# Patient Record
Sex: FEMALE | Race: WHITE | HISPANIC OR LATINO | Employment: FULL TIME | URBAN - METROPOLITAN AREA
[De-identification: names, ages, dates, MRNs, and addresses within clinical notes are randomized per-mention and may not be internally consistent; named-entity substitution may affect disease eponyms.]

---

## 2018-12-26 ENCOUNTER — HOSPITAL ENCOUNTER (EMERGENCY)
Facility: HOSPITAL | Age: 38
Discharge: HOME | End: 2018-12-26
Attending: EMERGENCY MEDICINE
Payer: COMMERCIAL

## 2018-12-26 ENCOUNTER — APPOINTMENT (EMERGENCY)
Dept: RADIOLOGY | Facility: HOSPITAL | Age: 38
End: 2018-12-26
Payer: COMMERCIAL

## 2018-12-26 VITALS
TEMPERATURE: 97.7 F | WEIGHT: 225 LBS | DIASTOLIC BLOOD PRESSURE: 82 MMHG | RESPIRATION RATE: 18 BRPM | HEART RATE: 92 BPM | SYSTOLIC BLOOD PRESSURE: 141 MMHG | OXYGEN SATURATION: 98 %

## 2018-12-26 DIAGNOSIS — S32.2XXA: Primary | ICD-10-CM

## 2018-12-26 LAB — EXT PREG TEST URINE: NEGATIVE

## 2018-12-26 PROCEDURE — 72220 X-RAY EXAM SACRUM TAILBONE: CPT

## 2018-12-26 PROCEDURE — 72170 X-RAY EXAM OF PELVIS: CPT

## 2018-12-26 PROCEDURE — 81025 URINE PREGNANCY TEST: CPT | Performed by: PHYSICIAN ASSISTANT

## 2018-12-26 PROCEDURE — 99284 EMERGENCY DEPT VISIT MOD MDM: CPT

## 2018-12-26 RX ORDER — OXYCODONE HYDROCHLORIDE AND ACETAMINOPHEN 5; 325 MG/1; MG/1
1 TABLET ORAL EVERY 8 HOURS PRN
Qty: 10 TABLET | Refills: 0 | Status: SHIPPED | OUTPATIENT
Start: 2018-12-26 | End: 2020-01-05

## 2018-12-26 RX ORDER — OXYCODONE HYDROCHLORIDE AND ACETAMINOPHEN 5; 325 MG/1; MG/1
1 TABLET ORAL ONCE
Status: COMPLETED | OUTPATIENT
Start: 2018-12-26 | End: 2018-12-26

## 2018-12-26 RX ADMIN — OXYCODONE HYDROCHLORIDE AND ACETAMINOPHEN 1 TABLET: 5; 325 TABLET ORAL at 21:44

## 2018-12-27 NOTE — DISCHARGE INSTRUCTIONS
Return to the Emergency Department sooner if increased pain, swelling, numbness, weakness, vomiting, difficulty breathing, redness  Use donut pillow  Coccyx Injury   WHAT YOU NEED TO KNOW:   What is a coccyx injury? A coccyx (tailbone) injury is when your coccyx breaks, dislocates, or is not stable  The coccyx is a small bone shaped like a triangle that forms the bottom of your spine  What causes a coccyx injury? · A car accident or a fall    · Medical conditions, such as hip arthritis or obesity    · Childbirth    · A direct hit to your coccyx, such as during sports    · Growths or an infection in the tissues near your coccyx  What are the signs and symptoms of a coccyx injury? Coccyx pain may last for a short period of time  It may also last for more than 2 months  You usually feel pain when you are about to sit, when you sit down, or when you stand up  You may also see or feel any of the following:  · Bruises or swelling on your coccyx or lower back    · Low backache or pressure in your pelvis (hip area)    · Pain in your buttocks that spreads to your thighs or legs    · Pain during bowel movements, when having sex, and when bending or lifting objects    · Trouble standing up or walking  How is a coccyx injury diagnosed? · A rectal exam  will be done to check for tenderness and the position of your coccyx  · An x-ray  may be done to look for a coccyx fracture  How is a coccyx injury treated? · Reduction  may be needed if you have a dislocated coccyx  During a reduction, your healthcare provider moves your tailbone into the correct position through your rectum  You will be given medicine to decrease discomfort during this procedure  · Medicines:     ¨ Prescription pain medicine  may be given to decrease pain  Do not wait until the pain is severe before you take this medicine  ¨ NSAIDs  decrease swelling and pain or fever  This medicine can be bought with or without a doctor's order   This medicine can cause stomach bleeding or kidney problems in certain people  If you take blood thinner medicine, always ask your healthcare provider if NSAIDs are safe for you  Always read the medicine label and follow the directions on it before using this medicine  ¨ A bowel movement softener  makes it easier and less painful for you to have a bowel movement  How can I manage my symptoms? · Use a donut-shaped cushion  to decrease pain and support your coccyx when you sit  · Ice  helps decrease swelling and pain  Ice may also help prevent tissue damage  Use an ice pack, or put crushed ice in a plastic bag  Cover it with a towel and place it on your coccyx for 15 to 20 minutes every hour or as directed  · Sleep  on a firm mattress  Place a pillow under your knees if you sleep on your back  Or, sleep on your side with a pillow between your knees  This will decrease pain and tension in your coccyx and back  When should I contact my healthcare provider? · You have trouble urinating or having a bowel movement  · Your pain or swelling get worse or do not go away with treatment  · You have a fever  · You have questions or concerns about your condition or care  When should I seek immediate help or call 911? · You have trouble breathing  · You cannot move your legs  · Your legs suddenly go numb  · You have severe pain  CARE AGREEMENT:   You have the right to help plan your care  Learn about your health condition and how it may be treated  Discuss treatment options with your caregivers to decide what care you want to receive  You always have the right to refuse treatment  The above information is an  only  It is not intended as medical advice for individual conditions or treatments  Talk to your doctor, nurse or pharmacist before following any medical regimen to see if it is safe and effective for you    © 2017 [employer]ura Bowman Information is for End User's use only and may not be sold, redistributed or otherwise used for commercial purposes  All illustrations and images included in CareNotes® are the copyrighted property of A D A M , Inc  or Ted Stephenson

## 2018-12-27 NOTE — ED PROVIDER NOTES
History  Chief Complaint   Patient presents with   Dereck Good 2 days ago, now sacral pain, denies head injury  42 y/o female slipped and fell onto her buttocks 2 days ago  Landed directly on buttocks  Progressively worsening pain since then  No pain above the buttocks, into the lumbar or thoracic spine  No other injuries reported  No head injury or LOC  Denies numbness tingling or weakness in her lower extremities  No saddle anesthesia  No urinary bowel incontinence  She has been ambulating without difficulty  She has pain with sitting  No pain with bowel movements  No fevers chills nausea vomiting  Does not take any anticoagulants or antiplatelets  No chronic steroid use  No malignancy  No history of IV drug abuse  History provided by:  Patient   used: No    Back Pain   Location:  Sacro-iliac joint  Quality:  Aching  Radiates to:  Does not radiate  Pain severity:  Severe  Pain is:  Same all the time  Onset quality:  Sudden  Duration:  2 days  Timing:  Constant  Progression:  Unchanged  Chronicity:  New  Context: falling    Relieved by:  Nothing  Worsened by:  Touching, sitting and twisting  Ineffective treatments:  OTC medications  Associated symptoms: no abdominal pain, no abdominal swelling, no bladder incontinence, no bowel incontinence, no chest pain, no dysuria, no fever, no headaches, no leg pain, no numbness, no paresthesias, no pelvic pain, no perianal numbness, no tingling, no weakness and no weight loss    Risk factors: no hx of cancer, no hx of osteoporosis, no lack of exercise, no menopause, not pregnant, no recent surgery, no steroid use and no vascular disease        None       History reviewed  No pertinent past medical history  Past Surgical History:   Procedure Laterality Date    CARPAL TUNNEL RELEASE       SECTION         History reviewed  No pertinent family history  I have reviewed and agree with the history as documented      Social History   Substance Use Topics    Smoking status: Never Smoker    Smokeless tobacco: Never Used    Alcohol use No        Review of Systems   Constitutional: Negative for activity change, appetite change, chills, diaphoresis, fatigue, fever, unexpected weight change and weight loss  HENT: Negative for congestion, rhinorrhea, sinus pressure, sore throat and trouble swallowing  Eyes: Negative for photophobia and visual disturbance  Respiratory: Negative for apnea, cough, choking, chest tightness, shortness of breath, wheezing and stridor  Cardiovascular: Negative for chest pain, palpitations and leg swelling  Gastrointestinal: Negative for abdominal distention, abdominal pain, blood in stool, bowel incontinence, constipation, diarrhea, nausea and vomiting  Genitourinary: Negative for bladder incontinence, decreased urine volume, difficulty urinating, dysuria, enuresis, flank pain, frequency, hematuria, pelvic pain and urgency  Musculoskeletal: Positive for back pain  Negative for arthralgias, myalgias, neck pain and neck stiffness  Skin: Negative for color change, pallor, rash and wound  Allergic/Immunologic: Negative  Neurological: Negative for dizziness, tingling, tremors, syncope, weakness, light-headedness, numbness, headaches and paresthesias  Hematological: Negative  Psychiatric/Behavioral: Negative  All other systems reviewed and are negative  Physical Exam  Physical Exam   Constitutional: She is oriented to person, place, and time  She appears well-developed and well-nourished  Non-toxic appearance  She does not have a sickly appearance  She does not appear ill  No distress  HENT:   Head: Normocephalic and atraumatic  Eyes: Pupils are equal, round, and reactive to light  EOM and lids are normal    Neck: Normal range of motion  Neck supple  Cardiovascular: Normal rate, regular rhythm, S1 normal, S2 normal, normal heart sounds, intact distal pulses and normal pulses  Exam reveals no gallop, no distant heart sounds, no friction rub and no decreased pulses  No murmur heard  Pulses:       Radial pulses are 2+ on the right side, and 2+ on the left side  Pulmonary/Chest: Effort normal and breath sounds normal  No accessory muscle usage  No apnea, no tachypnea and no bradypnea  No respiratory distress  She has no decreased breath sounds  She has no wheezes  She has no rhonchi  She has no rales  Abdominal: Soft  Normal appearance  She exhibits no distension  There is no tenderness  There is no rigidity, no rebound and no guarding  Musculoskeletal: Normal range of motion  She exhibits no edema or deformity  Lumbar back: She exhibits tenderness, bony tenderness and pain  She exhibits normal range of motion, no swelling, no edema, no deformity, no laceration, no spasm and normal pulse  Back:    Neurological: She is alert and oriented to person, place, and time  No cranial nerve deficit  GCS eye subscore is 4  GCS verbal subscore is 5  GCS motor subscore is 6  GCS 15  AAOx3  Ambulating in department without difficulty  CN II-XII grossly intact  No focal neuro deficits  Skin: Skin is warm, dry and intact  No rash noted  She is not diaphoretic  No erythema  No pallor  Psychiatric: Her speech is normal    Nursing note and vitals reviewed        Vital Signs  ED Triage Vitals [12/26/18 2100]   Temperature Pulse Respirations Blood Pressure SpO2   97 7 °F (36 5 °C) 91 18 159/83 99 %      Temp Source Heart Rate Source Patient Position - Orthostatic VS BP Location FiO2 (%)   Oral Monitor Sitting Left arm --      Pain Score       Worst Possible Pain           Vitals:    12/26/18 2100 12/26/18 2251   BP: 159/83 141/82   Pulse: 91 92   Patient Position - Orthostatic VS: Sitting        Visual Acuity      ED Medications  Medications   oxyCODONE-acetaminophen (PERCOCET) 5-325 mg per tablet 1 tablet (1 tablet Oral Given 12/26/18 4116)       Diagnostic Studies  Results Reviewed     Procedure Component Value Units Date/Time    POCT pregnancy, urine [[id number]]  (Normal) Resulted:  12/26/18 2153    Lab Status:  Final result Updated:  12/26/18 2154     EXT PREG TEST UR (Ref: Negative) negative                 XR sacrum and coccyx   ED Interpretation by Rainer Pedroza PA-C (12/26 2243)   fx coccyx      Final Result by Kaylah Wilson MD (12/27 5671)      Nondisplaced fracture of the coccyx  Findings are concordant with preliminary report from the emergency department as documented in [address] [place] [address] Rd  Workstation performed: [id number]         XR pelvis ap only 1 or 2 vw   ED Interpretation by Rainer Pedroza PA-C (12/26 2243)   fx coccyx      Final Result by Kaylah Wilson MD (12/27 3918)      Nondisplaced fracture of the coccyx  Findings are concordant with preliminary report from the emergency department as documented in [address] [place] [address] Rd  Workstation performed: [id number]                    Procedures  Procedures       Phone Contacts  ED Phone Contact    ED Course                               MDM  Number of Diagnoses or Management Options  Fractured coccyx [facility]): new and requires workup  Diagnosis management comments: Differential diagnosis including but not limited to: sprain, strain, fracture, dislocation, contusion  Plan: XR  Analgesia  dispo pending  Amount and/or Complexity of Data Reviewed  Tests in the radiology section of CPT®: ordered and reviewed  Independent visualization of images, tracings, or specimens: yes    Risk of Complications, Morbidity, and/or Mortality  Presenting problems: low  Management options: low  General comments: 40 y/o female with injury to sacrum after fall  Found to have non-displaced fx of coccyx  Her pain improved after being given analgesia  Will d/c home with pain control  Follow up PCP  Discussed basic care of coccyx fx  Return parameters provided  Pt understands and agrees with plan        Patient Progress  Patient progress: stable    CritCare Time    Disposition  Final diagnoses:   Fractured coccyx [facility])     Time reflects when diagnosis was documented in both MDM as applicable and the Disposition within this note     Time User Action Codes Description Comment    12/26/2018 10:44 PM Meghna Navarro Add Chepe Lugo  2XXA] Fractured [facility])       ED Disposition     ED Disposition Condition Comment    Discharge  Odean Holter discharge to home/self care  Condition at discharge: Good        Follow-up Information     Follow up With Specialties Details Why Contact Info Additional [address] [facility] Emergency Department Emergency Medicine Go to If symptoms worsen [address] [address] ED, [address] [place], [place], [address] [place] [address], MD Internal Medicine Call if you need a family doctor to follow up with, otherwise see your family doctor in follow up [address]  [phone]             Discharge Medication List as of 12/26/2018 10:45 PM      START taking these medications    Details   oxyCODONE-acetaminophen (PERCOCET) 5-325 mg per tablet Take 1 tablet by mouth every 8 (eight) hours as needed for moderate pain Max Daily Amount: 3 tablets, Starting Wed 12/26/2018, Print           No discharge procedures on file      ED Provider  Electronically Signed by           Rainer Pedroza PA-C  01/08/19 0551

## 2019-09-18 ENCOUNTER — HOSPITAL ENCOUNTER (EMERGENCY)
Facility: HOSPITAL | Age: 39
Discharge: HOME | End: 2019-09-19
Attending: EMERGENCY MEDICINE | Admitting: EMERGENCY MEDICINE

## 2019-09-18 ENCOUNTER — APPOINTMENT (EMERGENCY)
Dept: ULTRASOUND IMAGING | Facility: HOSPITAL | Age: 39
End: 2019-09-18

## 2019-09-18 VITALS
DIASTOLIC BLOOD PRESSURE: 74 MMHG | TEMPERATURE: 98 F | SYSTOLIC BLOOD PRESSURE: 115 MMHG | HEART RATE: 78 BPM | OXYGEN SATURATION: 100 % | RESPIRATION RATE: 18 BRPM

## 2019-09-18 DIAGNOSIS — R10.2 VAGINAL PAIN: Primary | ICD-10-CM

## 2019-09-18 DIAGNOSIS — R10.2 PELVIC PAIN: ICD-10-CM

## 2019-09-18 LAB
BILIRUB UR QL STRIP: NEGATIVE
CLARITY UR: CLEAR
COLOR UR: NORMAL
EXT PREG TEST URINE: NEGATIVE
EXT. CONTROL ED NAV: NORMAL
GLUCOSE UR STRIP-MCNC: NEGATIVE MG/DL
HGB UR QL STRIP.AUTO: NEGATIVE
KETONES UR STRIP-MCNC: NEGATIVE MG/DL
LEUKOCYTE ESTERASE UR QL STRIP: NEGATIVE
NITRITE UR QL STRIP: NEGATIVE
PH UR STRIP.AUTO: 6 [PH]
PROT UR STRIP-MCNC: NEGATIVE MG/DL
SP GR UR STRIP.AUTO: 1.01 (ref 1–1.03)
UROBILINOGEN UR QL STRIP.AUTO: 0.2 E.U./DL

## 2019-09-18 PROCEDURE — 81003 URINALYSIS AUTO W/O SCOPE: CPT | Performed by: EMERGENCY MEDICINE

## 2019-09-18 PROCEDURE — 76856 US EXAM PELVIC COMPLETE: CPT

## 2019-09-18 PROCEDURE — 87591 N.GONORRHOEAE DNA AMP PROB: CPT | Performed by: EMERGENCY MEDICINE

## 2019-09-18 PROCEDURE — 99284 EMERGENCY DEPT VISIT MOD MDM: CPT

## 2019-09-18 PROCEDURE — 76830 TRANSVAGINAL US NON-OB: CPT

## 2019-09-18 PROCEDURE — 87491 CHLMYD TRACH DNA AMP PROBE: CPT | Performed by: EMERGENCY MEDICINE

## 2019-09-18 PROCEDURE — 99284 EMERGENCY DEPT VISIT MOD MDM: CPT | Performed by: EMERGENCY MEDICINE

## 2019-09-18 PROCEDURE — 81025 URINE PREGNANCY TEST: CPT | Performed by: EMERGENCY MEDICINE

## 2019-09-18 RX ORDER — NAPROXEN 500 MG/1
500 TABLET ORAL 2 TIMES DAILY PRN
Qty: 14 TABLET | Refills: 0 | Status: SHIPPED | OUTPATIENT
Start: 2019-09-18 | End: 2020-01-05

## 2019-09-18 RX ORDER — METRONIDAZOLE 500 MG/1
500 TABLET ORAL ONCE
Status: COMPLETED | OUTPATIENT
Start: 2019-09-19 | End: 2019-09-18

## 2019-09-18 RX ORDER — NAPROXEN 250 MG/1
500 TABLET ORAL ONCE
Status: COMPLETED | OUTPATIENT
Start: 2019-09-19 | End: 2019-09-18

## 2019-09-18 RX ORDER — METRONIDAZOLE 500 MG/1
500 TABLET ORAL EVERY 12 HOURS SCHEDULED
Qty: 14 TABLET | Refills: 0 | Status: SHIPPED | OUTPATIENT
Start: 2019-09-18 | End: 2019-09-25

## 2019-09-18 RX ADMIN — NAPROXEN 500 MG: 250 TABLET ORAL at 23:58

## 2019-09-18 RX ADMIN — METRONIDAZOLE 500 MG: 500 TABLET, FILM COATED ORAL at 23:58

## 2019-09-19 NOTE — ED PROVIDER NOTES
History  Chief Complaint   Patient presents with    Vaginal Pain     pt states she started with vaginal pain and discharge last week  states she had her period for one day but she is normally a heavy bleeder  states she had a tubal ligation 6 years ago     HPI   56-year-old pleasant, well-appearing female presents to the emergency department with complaint of vaginal pain  Patient states that she has had pressure-like vaginal pain since last week  She reports associated clear odorless vaginal discharge and nausea  She also recalls polyuria and urinary frequency during this time  She denies having had similar symptoms in the past   She does mention that her menstrual period came as planned 2 weeks ago, but was light and only lasted for one day (while typically she bleeds more heavily and for multiple days)  Since that time, she has had almost constant suprapubic pressure, as if her uterus is full and heavy  She denies any associated complaints and has not noted any modifying factors for her symptoms  She denies any concern for STDs, is sexually active only with her   ROS otherwise negative  Prior to Admission Medications   Prescriptions Last Dose Informant Patient Reported? Taking?   oxyCODONE-acetaminophen (PERCOCET) 5-325 mg per tablet Not Taking at Unknown time  No No   Sig: Take 1 tablet by mouth every 8 (eight) hours as needed for moderate pain Max Daily Amount: 3 tablets   Patient not taking: Reported on 2019      Facility-Administered Medications: None       Past Medical History:   Diagnosis Date    Hemorrhoids        Past Surgical History:   Procedure Laterality Date    CARPAL TUNNEL RELEASE       SECTION      TUBAL LIGATION         History reviewed  No pertinent family history  I have reviewed and agree with the history as documented      Social History     Tobacco Use    Smoking status: Never Smoker    Smokeless tobacco: Never Used   Substance Use Topics    Alcohol use: No    Drug use: No        Review of Systems   Constitutional: Negative for fever  Respiratory: Negative for shortness of breath  Cardiovascular: Negative for chest pain  Gastrointestinal: Positive for nausea  Negative for vomiting  Endocrine: Positive for polyuria  Genitourinary: Positive for frequency, menstrual problem, pelvic pain and vaginal discharge  Negative for decreased urine volume and vaginal bleeding  Skin: Negative for wound  All other systems reviewed and are negative  Physical Exam  Physical Exam   Constitutional: She is oriented to person, place, and time  She appears well-nourished  No distress  HENT:   Head: Normocephalic and atraumatic  Eyes: EOM are normal    Neck: Normal range of motion  Neck supple  Cardiovascular: Normal rate and regular rhythm  Pulmonary/Chest: Effort normal and breath sounds normal  No respiratory distress  Abdominal: Soft  She exhibits no distension  There is no tenderness  Genitourinary: Pelvic exam was performed with patient supine  Genitourinary Comments: Pelvic exam difficult secondary to patient discomfort/anxiety  No obvious abnormality noted, however exam limited  Musculoskeletal: Normal range of motion  Neurological: She is alert and oriented to person, place, and time  Skin: Skin is warm and dry  She is not diaphoretic  Psychiatric: She has a normal mood and affect  Her behavior is normal    Nursing note and vitals reviewed        Vital Signs  ED Triage Vitals   Temperature Pulse Respirations Blood Pressure SpO2   09/18/19 2002 09/18/19 2002 09/18/19 2002 09/18/19 2002 09/18/19 2002   98 °F (36 7 °C) 86 18 129/86 100 %      Temp Source Heart Rate Source Patient Position - Orthostatic VS BP Location FiO2 (%)   09/18/19 2002 09/18/19 2002 09/18/19 2002 09/18/19 2002 --   Oral Monitor Sitting Left arm       Pain Score       09/18/19 2208       No Pain           Vitals:    09/18/19 2002 09/18/19 2208   BP: 129/86 115/74   Pulse: 86 78   Patient Position - Orthostatic VS: Sitting Lying         Visual Acuity      ED Medications  Medications   metroNIDAZOLE (FLAGYL) tablet 500 mg (500 mg Oral Given 9/18/19 2358)   naproxen (NAPROSYN) tablet 500 mg (500 mg Oral Given 9/18/19 2358)       Diagnostic Studies  Results Reviewed     Procedure Component Value Units Date/Time    Chlamydia/GC amplified DNA by PCR [[id number]]  (Normal) Collected:  09/18/19 2101    Lab Status:  Final result Specimen:  Urine, Other Updated:  09/20/19 0843     N gonorrhoeae, DNA Probe Negative     Chlamydia trachomatis, DNA Probe Negative    Narrative:       Test performed using PCR amplification of target DNA  This test is intended as an aid in the diagnosis of Chlamydial and gonococcal disease  This test has not been evaluated in patients younger than 15years of age and is not recommended for evaluation of suspected sexual abuse  Additional testing is recommended when the results do not correlate with clinical signs and symptoms  UA w Reflex to Microscopic w Reflex to Culture [[id number]] Collected:  09/18/19 2057    Lab Status:  Final result Specimen:  Urine, Clean Catch Updated:  09/18/19 2113     Color, UA Light Yellow     Clarity, UA Clear     Specific Gravity, UA 1 010     pH, UA 6 0     Leukocytes, UA Negative     Nitrite, UA Negative     Protein, UA Negative mg/dl      Glucose, UA Negative mg/dl      Ketones, UA Negative mg/dl      Urobilinogen, UA 0 2 E U /dl      Bilirubin, UA Negative     Blood, UA Negative    POCT pregnancy, urine [[id number]]  (Normal) Resulted:  09/18/19 2057    Lab Status:  Final result Updated:  09/18/19 2057     EXT PREG TEST UR (Ref: Negative) negative     Control valid                 US pelvis complete w transvaginal   Final Result by Timi Mancini MD (09/18 0730)       1  Markedly thickened endometrium  No evidence of uterine mass  2   Right ovarian 4 cm simple cyst   No evidence of torsion    Left ovary not visualized  Workstation performed: [id number]                    Procedures  Procedures       ED Course                               MDM    Disposition  Final diagnoses:   Vaginal pain   Pelvic pain     Time reflects when diagnosis was documented in both MDM as applicable and the Disposition within this note     Time User Action Codes Description Comment    9/18/2019 11:56 PM Eliu EmilyHeavenly Add [R10 2] Vaginal pain     9/18/2019 11:56 PM Zane Sutton Add [R10 2] Pelvic pain       ED Disposition     ED Disposition Condition Date/Time Comment    Discharge Stable Wed Sep 18, 2019 11:56 PM Uche Coon discharge to home/self care              Follow-up Information     Follow up With Specialties Details Why Contact Info Additional [address] Obstetrics and Gynecology Schedule an appointment as soon as possible for a visit   [address] [facility] [phone] [address] Gynecology [address] [facility], [facility] [place], [place], [address]    [facility] Emergency Department Emergency Medicine  As needed, If symptoms worsen 34 [facility] 1490 ED, [address] [place], [place], [address]          Discharge Medication List as of 9/18/2019 11:58 PM      START taking these medications    Details   metroNIDAZOLE (FLAGYL) 500 mg tablet Take 1 tablet (500 mg total) by mouth every 12 (twelve) hours for 7 days, Starting Wed 9/18/2019, Until Wed 9/25/2019, Print      naproxen (NAPROSYN) 500 mg tablet Take 1 tablet (500 mg total) by mouth 2 (two) times a day as needed for moderate pain for up to 7 days, Starting Wed 9/18/2019, Until Wed 9/25/2019, Print         CONTINUE these medications which have NOT CHANGED    Details   oxyCODONE-acetaminophen (PERCOCET) 5-325 mg per tablet Take 1 tablet by mouth every 8 (eight) hours as needed for moderate pain Max Daily Amount: 3 tablets, Starting Wed 12/26/2018, Print           No discharge procedures on file      ED Provider  Electronically Signed by           Janina Sy MD  09/30/19 2425

## 2019-09-20 LAB
C TRACH DNA SPEC QL NAA+PROBE: NEGATIVE
N GONORRHOEA DNA SPEC QL NAA+PROBE: NEGATIVE

## 2020-01-05 ENCOUNTER — APPOINTMENT (EMERGENCY)
Dept: ULTRASOUND IMAGING | Facility: HOSPITAL | Age: 40
End: 2020-01-05

## 2020-01-05 ENCOUNTER — HOSPITAL ENCOUNTER (EMERGENCY)
Facility: HOSPITAL | Age: 40
Discharge: HOME | End: 2020-01-05
Attending: EMERGENCY MEDICINE | Admitting: EMERGENCY MEDICINE

## 2020-01-05 VITALS
HEIGHT: 67 IN | DIASTOLIC BLOOD PRESSURE: 69 MMHG | BODY MASS INDEX: 35.31 KG/M2 | WEIGHT: 225 LBS | SYSTOLIC BLOOD PRESSURE: 116 MMHG | HEART RATE: 57 BPM | RESPIRATION RATE: 18 BRPM | OXYGEN SATURATION: 100 % | TEMPERATURE: 98 F

## 2020-01-05 DIAGNOSIS — N93.8 DYSFUNCTIONAL UTERINE BLEEDING: Primary | ICD-10-CM

## 2020-01-05 DIAGNOSIS — D64.9 ANEMIA: ICD-10-CM

## 2020-01-05 LAB
ANION GAP SERPL CALCULATED.3IONS-SCNC: 8 MMOL/L (ref 4–13)
APTT PPP: 26 SECONDS (ref 23–37)
BASOPHILS # BLD AUTO: 0.07 THOUSANDS/ΜL (ref 0–0.1)
BASOPHILS NFR BLD AUTO: 1 % (ref 0–1)
BUN SERPL-MCNC: 10 MG/DL (ref 5–25)
CALCIUM SERPL-MCNC: 8.8 MG/DL (ref 8.3–10.1)
CHLORIDE SERPL-SCNC: 105 MMOL/L (ref 100–108)
CO2 SERPL-SCNC: 28 MMOL/L (ref 21–32)
CREAT SERPL-MCNC: 0.61 MG/DL (ref 0.6–1.3)
EOSINOPHIL # BLD AUTO: 0.34 THOUSAND/ΜL (ref 0–0.61)
EOSINOPHIL NFR BLD AUTO: 4 % (ref 0–6)
ERYTHROCYTE [DISTWIDTH] IN BLOOD BY AUTOMATED COUNT: 18.2 % (ref 11.6–15.1)
EXT PREG TEST URINE: NEGATIVE
EXT. CONTROL ED NAV: NORMAL
GFR SERPL CREATININE-BSD FRML MDRD: 115 ML/MIN/1.73SQ M
GLUCOSE SERPL-MCNC: 94 MG/DL (ref 65–140)
HCT VFR BLD AUTO: 28.7 % (ref 34.8–46.1)
HGB BLD-MCNC: 7.8 G/DL (ref 11.5–15.4)
IMM GRANULOCYTES # BLD AUTO: 0.03 THOUSAND/UL (ref 0–0.2)
IMM GRANULOCYTES NFR BLD AUTO: 0 % (ref 0–2)
INR PPP: 0.97 (ref 0.84–1.19)
LYMPHOCYTES # BLD AUTO: 2.51 THOUSANDS/ΜL (ref 0.6–4.47)
LYMPHOCYTES NFR BLD AUTO: 29 % (ref 14–44)
MCH RBC QN AUTO: 20 PG (ref 26.8–34.3)
MCHC RBC AUTO-ENTMCNC: 27.2 G/DL (ref 31.4–37.4)
MCV RBC AUTO: 74 FL (ref 82–98)
MONOCYTES # BLD AUTO: 0.53 THOUSAND/ΜL (ref 0.17–1.22)
MONOCYTES NFR BLD AUTO: 6 % (ref 4–12)
NEUTROPHILS # BLD AUTO: 5.13 THOUSANDS/ΜL (ref 1.85–7.62)
NEUTS SEG NFR BLD AUTO: 60 % (ref 43–75)
NRBC BLD AUTO-RTO: 0 /100 WBCS
PLATELET # BLD AUTO: 513 THOUSANDS/UL (ref 149–390)
PMV BLD AUTO: 9 FL (ref 8.9–12.7)
POTASSIUM SERPL-SCNC: 3.5 MMOL/L (ref 3.5–5.3)
PROTHROMBIN TIME: 12.9 SECONDS (ref 11.6–14.5)
RBC # BLD AUTO: 3.9 MILLION/UL (ref 3.81–5.12)
SODIUM SERPL-SCNC: 141 MMOL/L (ref 136–145)
WBC # BLD AUTO: 8.61 THOUSAND/UL (ref 4.31–10.16)

## 2020-01-05 PROCEDURE — 36415 COLL VENOUS BLD VENIPUNCTURE: CPT | Performed by: PHYSICIAN ASSISTANT

## 2020-01-05 PROCEDURE — 76830 TRANSVAGINAL US NON-OB: CPT

## 2020-01-05 PROCEDURE — 80048 BASIC METABOLIC PNL TOTAL CA: CPT | Performed by: PHYSICIAN ASSISTANT

## 2020-01-05 PROCEDURE — 99284 EMERGENCY DEPT VISIT MOD MDM: CPT | Performed by: PHYSICIAN ASSISTANT

## 2020-01-05 PROCEDURE — 96372 THER/PROPH/DIAG INJ SC/IM: CPT

## 2020-01-05 PROCEDURE — 99284 EMERGENCY DEPT VISIT MOD MDM: CPT

## 2020-01-05 PROCEDURE — 81025 URINE PREGNANCY TEST: CPT | Performed by: PHYSICIAN ASSISTANT

## 2020-01-05 PROCEDURE — 76856 US EXAM PELVIC COMPLETE: CPT

## 2020-01-05 PROCEDURE — 85025 COMPLETE CBC W/AUTO DIFF WBC: CPT | Performed by: PHYSICIAN ASSISTANT

## 2020-01-05 PROCEDURE — 85730 THROMBOPLASTIN TIME PARTIAL: CPT | Performed by: PHYSICIAN ASSISTANT

## 2020-01-05 PROCEDURE — 85610 PROTHROMBIN TIME: CPT | Performed by: PHYSICIAN ASSISTANT

## 2020-01-05 PROCEDURE — 96374 THER/PROPH/DIAG INJ IV PUSH: CPT

## 2020-01-05 RX ORDER — FERROUS SULFATE 325(65) MG
325 TABLET ORAL 2 TIMES DAILY WITH MEALS
Qty: 30 TABLET | Refills: 0 | Status: SHIPPED | OUTPATIENT
Start: 2020-01-05 | End: 2020-01-14

## 2020-01-05 RX ORDER — IBUPROFEN 600 MG/1
TABLET ORAL EVERY 6 HOURS PRN
COMMUNITY

## 2020-01-05 RX ORDER — TRANEXAMIC ACID 650 1/1
2 TABLET ORAL 3 TIMES DAILY
Qty: 30 TABLET | Refills: 0 | Status: SHIPPED | OUTPATIENT
Start: 2020-01-05 | End: 2020-01-10

## 2020-01-05 RX ORDER — MEDROXYPROGESTERONE ACETATE 150 MG/ML
150 INJECTION, SUSPENSION INTRAMUSCULAR ONCE
Status: COMPLETED | OUTPATIENT
Start: 2020-01-05 | End: 2020-01-05

## 2020-01-05 RX ADMIN — TRANEXAMIC ACID 1000 MG: 1 INJECTION, SOLUTION INTRAVENOUS at 16:49

## 2020-01-05 RX ADMIN — MEDROXYPROGESTERONE ACETATE 150 MG: 150 INJECTION, SUSPENSION, EXTENDED RELEASE INTRAMUSCULAR at 16:24

## 2020-01-05 NOTE — ED PROVIDER NOTES
History  Chief Complaint   Patient presents with    Vaginal Bleeding     Pt co vaginal bleeding with clots x 1 month  Pt co pain and numbness in left left leg and side of abdomen  Melida Hankins is a 46 y o  female w PMH hemorrhoids who presents for evaluation of vaginal bleeding  Patient with vaginal bleeding ongoing since November  She estimates for about 2 months  She reports soaking through 10 large pads per day and passing heavy clots that are about a quarter to half dollar in size  She is also having pelvic pain, on the L side  She describes this as a burning pain w associated numbness  The pain radiates up the L side of the abdomen to under her L breast  Also radiates down into the groin and into the upper aspect of the L leg  Reports pain is predominant in groin and LLQ region  No real leg pain, more just numbness down lateral aspect of thigh, no weakness, no UE numbness  No headaches, blurred vision, l/d  No numbness or her foot  No trouble ambulating  Reports she does not have insurance so doesn't have anyone to follow up with and she has held off seeking evaluation thus far as she has been able to still take care of her family and go about her normal day wo dizziness  Some sx of fatigue and shortness of breath  Prior to Admission Medications   Prescriptions Last Dose Informant Patient Reported? Taking? Iron 15 MG/1 5ML SUSP   Yes Yes   Sig: Take by mouth   ibuprofen (MOTRIN) 600 mg tablet   Yes Yes   Sig: Take by mouth every 6 (six) hours as needed for mild pain      Facility-Administered Medications: None       Past Medical History:   Diagnosis Date    Hemorrhoids        Past Surgical History:   Procedure Laterality Date    CARPAL TUNNEL RELEASE       SECTION      TUBAL LIGATION         History reviewed  No pertinent family history  I have reviewed and agree with the history as documented      Social History     Tobacco Use    Smoking status: Never Smoker    Smokeless tobacco: Never Used   Substance Use Topics    Alcohol use: No    Drug use: No        Review of Systems   Constitutional: Negative for chills, diaphoresis and fever  HENT: Negative for congestion and sore throat  Eyes: Negative for visual disturbance  Respiratory: Negative for cough, chest tightness, shortness of breath and wheezing  Cardiovascular: Negative for chest pain and leg swelling  Gastrointestinal: Negative for abdominal pain, constipation, diarrhea, nausea and vomiting  Genitourinary: Positive for pelvic pain and vaginal bleeding  Negative for difficulty urinating, dysuria, frequency, hematuria, urgency, vaginal discharge and vaginal pain  Musculoskeletal: Negative for arthralgias and myalgias  Neurological: Positive for numbness  Negative for dizziness, weakness, light-headedness and headaches  Psychiatric/Behavioral: The patient is not nervous/anxious  Physical Exam  Physical Exam   Constitutional: She is oriented to person, place, and time  She appears well-developed and well-nourished  No distress  HENT:   Head: Normocephalic and atraumatic  Eyes: Pupils are equal, round, and reactive to light  Neck: Normal range of motion  Neck supple  No tracheal deviation present  Cardiovascular: Normal rate, regular rhythm, normal heart sounds and intact distal pulses  Exam reveals no gallop and no friction rub  No murmur heard  Pulmonary/Chest: Effort normal and breath sounds normal  No respiratory distress  She has no wheezes  She has no rales  Abdominal: Soft  Bowel sounds are normal  She exhibits no distension and no mass  There is tenderness  There is no guarding  LLQ abd pain      Musculoskeletal: She exhibits no edema or deformity  Neurological: She is alert and oriented to person, place, and time  Skin: Skin is warm and dry  She is not diaphoretic     Veins visible beneath skin in legs, but no calf pain or inguinal pain, no pain along the deep veins, no edema  No redness, no pain to palpation  Numbness to lateral thigh  Normal distal pulses  Psychiatric: She has a normal mood and affect  Her behavior is normal    Nursing note and vitals reviewed        Vital Signs  ED Triage Vitals [01/05/20 1324]   Temperature Pulse Respirations Blood Pressure SpO2   98 °F (36 7 °C) 88 20 128/77 99 %      Temp Source Heart Rate Source Patient Position - Orthostatic VS BP Location FiO2 (%)   Oral Monitor Sitting Left arm --      Pain Score       No Pain           Vitals:    01/05/20 1324 01/05/20 1613 01/05/20 1615   BP: 128/77 116/69 116/69   Pulse: 88 57    Patient Position - Orthostatic VS: Sitting Lying          Visual Acuity      ED Medications  Medications   tranexamic Acid 1,000 mg in sodium chloride 0 9 % 100 mL IVPB Loading Dose (0 mg Intravenous Stopped 1/5/20 1659)   medroxyPROGESTERone (DEPO-PROVERA) IM injection 150 mg (150 mg Intramuscular Given 1/5/20 1624)       Diagnostic Studies  Results Reviewed     Procedure Component Value Units Date/Time    POCT pregnancy, urine [[id number]]  (Normal) Resulted:  01/05/20 1615    Lab Status:  Final result Updated:  01/05/20 1615     EXT PREG TEST UR (Ref: Negative) negative     Control valid    Protime-INR [[id number]]  (Normal) Collected:  01/05/20 1411    Lab Status:  Final result Specimen:  Blood from Arm, Right Updated:  01/05/20 1442     Protime 12 9 seconds      INR 0 97    APTT [[id number]]  (Normal) Collected:  01/05/20 1411    Lab Status:  Final result Specimen:  Blood from Arm, Right Updated:  01/05/20 1442     PTT 26 seconds     Basic metabolic panel [[id number]] Collected:  01/05/20 1411    Lab Status:  Final result Specimen:  Blood from Arm, Right Updated:  01/05/20 1435     Sodium 141 mmol/L      Potassium 3 5 mmol/L      Chloride 105 mmol/L      CO2 28 mmol/L      ANION GAP 8 mmol/L      BUN 10 mg/dL      Creatinine 0 61 mg/dL      Glucose 94 mg/dL      Calcium 8 8 mg/dL      eGFR 115 ml/min/1 73sq m Narrative:       National Kid[employer] Foundation guidelines for Chronic Kidney Disease (CKD):     Stage 1 with normal or high GFR (GFR > 90 mL/min/1 73 square meters)    Stage 2 Mild CKD (GFR = 60-89 mL/min/1 73 square meters)    Stage 3A Moderate CKD (GFR = 45-59 mL/min/1 73 square meters)    Stage 3B Moderate CKD (GFR = 30-44 mL/min/1 73 square meters)    Stage 4 Severe CKD (GFR = 15-29 mL/min/1 73 square meters)    Stage 5 End Stage CKD (GFR <15 mL/min/1 73 square meters)  Note: GFR calculation is accurate only with a steady state creatinine    CBC and differential [[id number]]  (Abnormal) Collected:  01/05/20 1411    Lab Status:  Final result Specimen:  Blood from Arm, Right Updated:  01/05/20 1424     WBC 8 61 Thousand/uL      RBC 3 90 Million/uL      Hemoglobin 7 8 g/dL      Hematocrit 28 7 %      MCV 74 fL      MCH 20 0 pg      MCHC 27 2 g/dL      RDW 18 2 %      MPV 9 0 fL      Platelets 525 Thousands/uL      nRBC 0 /100 WBCs      Neutrophils Relative 60 %      Immat GRANS % 0 %      Lymphocytes Relative 29 %      Monocytes Relative 6 %      Eosinophils Relative 4 %      Basophils Relative 1 %      Neutrophils Absolute 5 13 Thousands/µL      Immature Grans Absolute 0 03 Thousand/uL      Lymphocytes Absolute 2 51 Thousands/µL      Monocytes Absolute 0 53 Thousand/µL      Eosinophils Absolute 0 34 Thousand/µL      Basophils Absolute 0 07 Thousands/µL                  US pelvis complete w transvaginal   Final Result by Federico Canales MD (01/05 1538)       Normal                       Workstation performed: [id number]                    Procedures  Procedures         ED Course                              MDM  Number of Diagnoses or Management Options  Anemia:   Dysfunctional uterine bleeding:   Diagnosis management comments: DDX includes but not ltd to:   Patient complains of heavy menstrual bleeding  Consider anemia  She is slightly symptomatic w some sx of fatigue and sob   Her weakness does not seem consistent w stroke as it is painful  Seems to originate in her stomach / pelvis where she is having pain, consider ovarian cyst, torsion less likely given chronicity of sx  Does not have sx of PID or acute infection, doubt UTI  Does not have LLE pain to suggest DVT other than numbness that radiates down the L thigh  No pain in the calf, thigh  24-20-52-61: Talked to on call OBGYN attending given her anemia and ongoing bleeding who advised TXA and depo here  Can send on home on TXA to be used as needed if recurrent heavy bleeding  Patient treated here w TXA and depo, discussed indications for TXA as an outpatient  Advised she follow up w the wom[facility] clinic in [place] as she does not have insurance  Return parameters discussed  Pt requires f/u as an outpt  Pt expresses understanding w above treatment plan  All questions answered prior to d/c  Portions of the record may have been created with voice recognition software   Occasional wrong word or "sound a like" substitutions may have occurred due to the inherent limitations of voice recognition software   Read the chart carefully and recognize, using context, where substitutions have occurred  Disposition  Final diagnoses:   Dysfunctional uterine bleeding   Anemia     Time reflects when diagnosis was documented in both MDM as applicable and the Disposition within this note     Time User Action Codes Description Comment    1/5/2020  3:52 PM Moni Lakhani Add [N93 8] Dysfunctional uterine bleeding     1/5/2020  3:52 PM Moni Lakhani Add [D64 9] Anemia       ED Disposition     ED Disposition Condition Date/Time Comment    Discharge Stable Sun Jan 5, 2020  3:52 PM Alisa Bryant discharge to home/self care              Follow-up Information     Follow up With Specialties Details Why Contact Info Additional [address] [facility] Emergency Department Emergency Medicine  If symptoms worsen [address] [facility] [address], [address] [place], [place], [address] [facility] Obstetrics and Gynecology Call in 1 day  [address]  [phone]  [address] [facility], [place], [place], [address]          Discharge Medication List as of 1/5/2020  4:08 PM      START taking these medications    Details   ferrous sulfate 325 (65 Fe) mg tablet Take 1 tablet (325 mg total) by mouth 2 (two) times a day with meals, Starting Sun 1/5/2020, Print      Tranexamic Acid 650 MG TABS Take 2 tablets by mouth 3 (three) times a day for 5 days You can take this medicine for up a day at a time for up to 5 days if you have severe heavy menstrual bleeding , Starting Sun 1/5/2020, Until Fri 1/10/2020, Print         CONTINUE these medications which have NOT CHANGED    Details   ibuprofen (MOTRIN) 600 mg tablet Take by mouth every 6 (six) hours as needed for mild pain, Historical Med      Iron 15 MG/1 5ML SUSP Take by mouth, Historical Med           No discharge procedures on file      ED Provider  Electronically Signed by           Dennie Prows, PA-C  01/10/20 8879

## 2020-01-14 ENCOUNTER — OFFICE VISIT (OUTPATIENT)
Dept: OBGYN CLINIC | Facility: CLINIC | Age: 40
End: 2020-01-14

## 2020-01-14 VITALS
BODY MASS INDEX: 39.33 KG/M2 | SYSTOLIC BLOOD PRESSURE: 114 MMHG | HEIGHT: 67 IN | DIASTOLIC BLOOD PRESSURE: 76 MMHG | WEIGHT: 250.6 LBS

## 2020-01-14 DIAGNOSIS — N93.9 ABNORMAL UTERINE BLEEDING (AUB): Primary | ICD-10-CM

## 2020-01-14 PROCEDURE — 99202 OFFICE O/P NEW SF 15 MIN: CPT | Performed by: NURSE PRACTITIONER

## 2020-01-14 RX ORDER — NORGESTIMATE AND ETHINYL ESTRADIOL 0.25-0.035
1 KIT ORAL DAILY
Qty: 30 TABLET | Refills: 0 | Status: SHIPPED | OUTPATIENT
Start: 2020-01-14 | End: 2020-10-26 | Stop reason: ALTCHOICE

## 2020-01-14 RX ORDER — MEDROXYPROGESTERONE ACETATE 150 MG/ML
150 INJECTION, SUSPENSION INTRAMUSCULAR
Qty: 1 ML | Refills: 0 | Status: SHIPPED | OUTPATIENT
Start: 2020-03-03 | End: 2020-10-26 | Stop reason: ALTCHOICE

## 2020-01-14 RX ORDER — MEDROXYPROGESTERONE ACETATE 150 MG/ML
150 INJECTION, SUSPENSION INTRAMUSCULAR
COMMUNITY
End: 2020-10-26 | Stop reason: ALTCHOICE

## 2020-01-14 NOTE — PATIENT INSTRUCTIONS
Tranexamic acid (By mouth)   Tranexamic Acid (kvei-tx-EB-ik AS-id)  Treats heavy monthly periods (menstrual cycles) in women  Brand Name(s): rEic   There may be other brand names for this medicine  When This Medicine Should Not Be Used: This medicine is not right for everyone  Do not use if you had an allergic reaction to tranexamic acid, or if you have blood clot problems or a history of blood clots  How to Use This Medicine:   Tablet  · Your doctor will tell you how much medicine to use  Do not use more than directed  · Start taking this medicine when your monthly period starts  Do not take this medicine for more than 5 days during your period  Do not take more than 6 tablets in one day  · Swallow the tablet whole with liquids  Do not break, crush, or chew it  · Read and follow the patient instructions that come with this medicine  Talk to your doctor or pharmacist if you have any questions  · Missed dose: If you miss a dose or forget to use your medicine, use it as soon as you can  Wait at least 6 hours before you take another dose  Do not use extra medicine to make up for a missed dose  · Store the medicine in a closed container at room temperature, away from heat, moisture, and direct light  Drugs and Foods to Avoid:   Ask your doctor or pharmacist before using any other medicine, including over-the-counter medicines, vitamins, and herbal products  · Do not use this medicine together with birth control that uses hormones, such as pills or a vaginal ring  · Some medicines and foods can affect how tranexamic acid works  Tell your doctor if you are using oral tretinoin or a medicine that changes how your blood clots, such as factor IX  Warnings While Using This Medicine:   · Tell your doctor if you are pregnant or breastfeeding, or if you have kidney disease, bleeding problems, or leukemia    · This medicine may cause the following problems:  ¨ Higher risk of blood clots (which can lead to heart attack or stroke) when used with hormone birth control, such as pills or a patch  ¨ Eye and vision problems  · If this medicine does not reduce your bleeding after 2 menstrual cycles or if it seems to stop working, check with your doctor  · Your doctor will check your progress and the effects of this medicine at regular visits  Keep all appointments  · Keep all medicine out of the reach of children  Never share your medicine with anyone  Possible Side Effects While Using This Medicine:   Call your doctor right away if you notice any of these side effects:  · Allergic reaction: Itching or hives, swelling in your face or hands, swelling or tingling in your mouth or throat, chest tightness, trouble breathing  · Chest pain, trouble breathing, or coughing up blood  · Numbness or weakness on one side of your body, sudden or severe headache, problems with vision, speech, or walking  · Pain in your lower leg  · Trouble seeing, vision changes  · Unusual bleeding, bruising, or weakness  If you notice these less serious side effects, talk with your doctor:   · Muscle, joint, or back pain  · Runny or stuffy nose  If you notice other side effects that you think are caused by this medicine, tell your doctor  Call your doctor for medical advice about side effects  You may report side effects to FDA at 6-429-FDA-7094  © 2017 [address] [employer] [employer] Information is for End User's use only and may not be sold, redistributed or otherwise used for commercial purposes  The above information is an  only  It is not intended as medical advice for individual conditions or treatments  Talk to your doctor, nurse or pharmacist before following any medical regimen to see if it is safe and effective for you  Medroxyprogesterone (By mouth)   Medroxyprogesterone (lu-jktq-fp-proe-PIOTR-ter-one)  Treats menstruation problems caused by a hormone imbalance   Prevents overgrowth of the uterine lining in women who are taking estrogen  Brand Name(s): Provera   There may be other brand names for this medicine  When This Medicine Should Not Be Used: This medicine is not right for everyone  Do not use it if you had an allergic reaction to medroxyprogesterone, if you are pregnant, or if you have liver disease, unusual vaginal bleeding that has not been checked by a doctor, or a history of breast cancer or blood clots  How to Use This Medicine:   Tablet  · Take your medicine as directed  Your dose may need to be changed several times to find what works best for you  · Read and follow the patient instructions that come with this medicine  Talk to your doctor or pharmacist if you have any questions  · Missed dose: Take a dose as soon as you remember  If it is almost time for your next dose, wait until then and take a regular dose  Do not take extra medicine to make up for a missed dose  · Store the medicine in a closed container at room temperature, away from heat, moisture, and direct light  Drugs and Foods to Avoid:      Ask your doctor or pharmacist before using any other medicine, including over-the-counter medicines, vitamins, and herbal products  Warnings While Using This Medicine:   · It is not safe to take this medicine during pregnancy  It could harm an unborn baby  Tell your doctor right away if you become pregnant  · Tell your doctor if you are breastfeeding or if you have kidney disease, heart disease, asthma, diabetes, endometriosis, high blood pressure, high cholesterol, lupus, porphyria, migraines, thyroid problems, or a history of seizures or cancer  Tell your doctor if you smoke  · This medicine may increase your risk for the following:   ¨ Blood clots, which could lead to stroke or heart attack  ¨ Dementia (when used with estrogen in women older than 65)  ¨ Breast or endometrial cancer (when used with estrogen)  · Tell any doctor who treats you that you use this medicine   You may need to stop taking it before you have surgery or if you need to be on bedrest   · Tell any doctor or dentist who treats you that you are using this medicine  This medicine may affect certain medical test results  · Your doctor will check your progress and the effects of this medicine at regular visits  Keep all appointments  · Keep all medicine out of the reach of children  Never share your medicine with anyone  Possible Side Effects While Using This Medicine:   Call your doctor right away if you notice any of these side effects:  · Allergic reaction: Itching or hives, swelling in your face or hands, swelling or tingling in your mouth or throat, chest tightness, trouble breathing  · Breast lump, pain, or tenderness  · Chest pain, trouble breathing, coughing up blood  · Loss of vision, blurred vision  · Numbness or weakness on one side of your body, sudden or severe headache, problems with speech or walking, pain in your lower leg (calf)  · Rapid weight gain, swelling in your hands, ankles, or feet  · Severe or unusual vaginal bleeding  · Sudden and severe stomach pain, nausea, vomiting, fever, lightheadedness  · Yellow skin or eyes  If you notice these less serious side effects, talk with your doctor:   · Depression, headache, dizziness, trouble sleeping  · Light vaginal bleeding or spotting  · Mild stomach pain or cramps  If you notice other side effects that you think are caused by this medicine, tell your doctor  Call your doctor for medical advice about side effects  You may report side effects to FDA at 2-736-FDA-8272  © 2017 [address] [employer] Information is for End User's use only and may not be sold, redistributed or otherwise used for commercial purposes  The above information is an  only  It is not intended as medical advice for individual conditions or treatments  Talk to your doctor, nurse or pharmacist before following any medical regimen to see if it is safe and effective for you    Ethinyl Estradiol/Norgestimate (By mouth)   Ethinyl Estradiol (ETH-i-nil es-tra-DYE-ol), Norgestimate (nds-CEG-ge-mate)  Prevents pregnancy and treats acne  This medicine is commonly called a birth control pill  Brand Name(s): Estarylla, Femynor, Mono-Linyah, MonoNessa, Ortho Tri-Cyclen, Ortho Tri-Cyclen Lo, Ortho-Cyclen, Previfem, Sprintec, Tri-Estarylla, Tri-Linyah, Tri-Lo-Estarylla, Tri-Lo-Trinidad, Tri-Lo-Sprintec, Tri-Previfem   There may be other brand names for this medicine  When This Medicine Should Not Be Used: This medicine is not right for everyone  Do not use it if you had an allergic reaction to ethinyl estradiol or norgestimate, or if you are pregnant or have unusual vaginal bleeding that has not been checked by your doctor  Do not use it if you have liver disease, breast cancer, or problems with blood clots  Do not use it if you have high blood pressure, certain heart problems, or diabetes with kidney, eye, nerve, or blood vessel damage  How to Use This Medicine:   Tablet  · Your doctor will tell you how much medicine to use  Do not use more than directed  · Each brand of birth control pills has specific directions  Read and follow the patient instructions for your prescribed brand  Talk to your doctor or pharmacist if you have any questions  · Ask your doctor if you should use a second form of birth control for the first 7 days of your first cycle of pills  · Take your pill at the same time every day  Birth control pills work best when there is no more than 24 hours between doses  Keep the pills in the original container  Take the pills in the order they appear in the container  · Follow the instructions in the patient leaflet or call your doctor if you vomit or have diarrhea within 3 to 4 hours of taking this medicine  · Missed dose: Read and carefully follow the patient instructions if you miss a dose  You may need to use a second form of birth control for several days   Ask your doctor or pharmacist if you have any questions  · Store the medicine in the original package at room temperature, away from heat, moisture, and direct light  Drugs and Foods to Avoid:   Ask your doctor or pharmacist before using any other medicine, including over-the-counter medicines, vitamins, and herbal products  · Some foods and medicines can affect how birth control pills work  Tell your doctor if you are also using the following:   ¨ Acetaminophen, aprepitant, ascorbic acid, aspirin, atorvastatin, boceprevir, bosentan, clofibrate, colesevelam, cyclosporine, morphine, prednisolone, rifabutin, rifampicin, rosuvastatin, Momo's wort, telaprevir, temazepam, theophylline, tizanidine  ¨ Seizure medicine, including carbamazepine, felbamate, lamotrigine, oxcarbazepine, phenytoin, topiramate  ¨ Thyroid replacement medicine  ¨ Medicine to treat an infection, including fluconazole, itraconazole, ketoconazole, voriconazole  ¨ Protease inhibitor to treat HIV/AIDS  Warnings While Using This Medicine:   · Tell your doctor right away if you think you have become pregnant  If you miss 2 monthly periods in a row, call your doctor for a pregnancy test before you take any more pills  · Tell your doctor if you are breastfeeding, or if you had a baby within 4 weeks before you start using this medicine  Tell your doctor if you have cervical cancer, diabetes, epilepsy, gallbladder problems, migraine headaches, heart or blood vessel disease, high cholesterol, or a family history of breast cancer or depression  Tell your doctor if you smoke or have a history of chloasma (skin discoloration of the face), especially during pregnancy    · This medicine may cause the following problems:  ¨ Blood clots, which may lead to stroke or heart attack (risk is increased by cigarette smoking)  ¨ Possible increased risk of breast or cervical cancer  ¨ Liver cancer or tumors  ¨ Gallbladder disease  ¨ High blood pressure  · You might have spotting or irregular bleeding when you first start to use this medicine  You might have unplanned bleeding if you miss a dose or are late taking it  Call your doctor if you think there is a problem, such as if you have heavy bleeding  · You may need to stop using this medicine for a few weeks before and after you have surgery because of the risk of blood clots  · This medicine will not protect you from HIV/AIDS or other sexually transmitted diseases  · Tell any doctor or dentist who treats you that you are using this medicine  This medicine may affect certain medical test results  · Keep all medicine out of the reach of children  Never share your medicine with anyone  Possible Side Effects While Using This Medicine:   Call your doctor right away if you notice any of these side effects:  · Allergic reaction: Itching or hives, swelling in your face or hands, swelling or tingling in your mouth or throat, chest tightness, trouble breathing  · Chest pain, trouble breathing, or coughing up blood  · Nausea, unusual sweating, fainting  · Numbness or weakness on one side of your body, sudden or severe headache, problems with vision, speech, or walking  · Pain in your lower leg (calf)  · Unusual or unexpected vaginal bleeding or heavy bleeding  · Yellow skin or eyes  · Vision loss, double vision  If you notice these less serious side effects, talk with your doctor:   · Depression, mood changes  · Headaches  · Light spotting or bleeding between periods  If you notice other side effects that you think are caused by this medicine, tell your doctor  Call your doctor for medical advice about side effects  You may report side effects to FDA at 6-619-FDA-2609  © 2017 [address] Information is for End User's use only and may not be sold, redistributed or otherwise used for commercial purposes  The above information is an  only  It is not intended as medical advice for individual conditions or treatments  Talk to your doctor, nurse or pharmacist before following any medical regimen to see if it is safe and effective for you  Dysfunctional Uterine Bleeding   WHAT YOU NEED TO KNOW:   What is dysfunctional uterine bleeding? Dysfunctional uterine bleeding (DUB) is abnormal uterine bleeding that is caused by a problem with your hormones  You may have bleeding from your uterus at times other than your normal monthly period  Your monthly periods may last longer or shorter, and bleeding may be heavier or lighter than usual    What causes DUB? DUB may be caused by too much or too little estrogen  You may have abnormal bleeding if an ovary does not release an egg during ovulation  Medical conditions such as polycystic ovary syndrome may increase your risk for DUB  What are the signs and symptoms of DUB? · Bleeding or spotting between periods    · Bleeding that starts 12 months or longer after you have been through menopause    · The amount of bleeding during your period is heavier or lighter than usual    · The number of days that you bleed during your regular period is longer than usual, or more than 7 days    · The number of days that you bleed is shorter than usual, or less than 2 days    · The time between your monthly periods is shorter or longer than usual  How is DUB diagnosed? · Blood tests  may be done to find the cause of your DUB and problems caused by DUB, such as anemia  · A pelvic exam  may be done to find the source of your bleeding  · A hysteroscopy  is a procedure to look at your endometrium  The endometrium is the lining inside of your uterus  Your healthcare provider will insert a small tube with a camera at the end into your uterus  · A biopsy  is a procedure to remove a small piece of tissue from the endometrium  The tissue is sent to a lab for tests  · An ultrasound  uses sound waves to show pictures of your uterus, ovaries, tubes, and vagina on a monitor       · A pap smear  may be needed  Your healthcare provider takes a sample of tissue from your cervix and sends it to a lab for tests  How is DUB treated? · Medicines:      ¨ Hormones  help decrease bleeding by making your monthly periods more regular  Sometimes this medicine may be given as birth control pills  ¨ NSAIDs  help decrease swelling and pain or fever  This medicine is available with or without a doctor's order  NSAIDs can cause stomach bleeding or kidney problems in certain people  If you take blood thinner medicine, always ask your healthcare provider if NSAIDs are safe for you  Always read the medicine label and follow directions  ¨ Iron supplements  may be given if your blood iron level decreases because of heavy bleeding  Iron may make you constipated  Ask your healthcare provider for ways to prevent or treat constipation  Iron may also make your bowel movements turn dark or black  · Surgery and procedures  may be needed if medicines do not work or cannot be used  You may need procedures, such as endometrial ablation or dilation and curettage, to control your bleeding  You may need an abdominal or vaginal hysterectomy  A hysterectomy is surgery to remove your uterus  How do I care for myself at home? · Apply heat  on your lower abdomen for 20 to 30 minutes every 2 hours for as many days as directed  Heat helps decrease pain and muscle spasms  · Include foods high in iron  if needed  Examples of foods high in iron are leafy green vegetables, beef, pork, liver, eggs, and whole-grain breads and cereals  · Keep a diary of your menstrual cycles  Keep track of the number of tampons or pads you use each day  · Talk to your healthcare provider before you start a weight loss program   You may need to wait until the abnormal bleeding has stopped before you try to lose weight  The amount of iron in your blood should be normal before you lose weight  When should I contact my healthcare provider?    · You need to change your sanitary pad or tampon more than once an hour  · Your medicine causes nausea, vomiting, or diarrhea  · You have questions or concerns about your condition or care  When should I seek immediate care or call 911? · You continue to bleed heavily, or you feel faint  CARE AGREEMENT:   You have the right to help plan your care  Learn about your health condition and how it may be treated  Discuss treatment options with your caregivers to decide what care you want to receive  You always have the right to refuse treatment  The above information is an  only  It is not intended as medical advice for individual conditions or treatments  Talk to your doctor, nurse or pharmacist before following any medical regimen to see if it is safe and effective for you  © 2017 [address] [facility] Information is for End User's use only and may not be sold, redistributed or otherwise used for commercial purposes  All illustrations and images included in CareNotes® are the copyrighted property of [employer]  or [employer]  Birth Control Pills   WHAT YOU NEED TO KNOW:   What are birth control pills? Birth control pills are also called oral contraceptives, or the pill  It is medicine that helps prevent pregnancy  Birth control pills work by preventing ovulation  Ovulation is when the ovaries make and release an egg cell each month  If this egg gets fertilized by sperm, pregnancy occurs  Birth control pills may also help to prevent pregnancy by keeping sperm from fertilizing an egg  What may be done before I can start taking birth control pills? You need to see your healthcare provider to get a prescription  Any of the following may be done before your healthcare provider gives you a prescription:  · Your healthcare provider will ask you about diseases and illnesses you have had in the past  He will check your risk for blood clots, heart conditions, or stroke   He will also check your blood pressure, and may do a breast and pelvic exam  A Pap smear may also be done during the pelvic exam  This is a test to make sure you do not have abnormal changes on your cervix  You may need other tests, such as a urine test, to make sure you are not pregnant  · Your healthcare provider will ask if you take any medicines and if you smoke  Smoking increases your risk for stroke, heart attack, or a blood clot in your lungs  If you smoke, you should not take certain kinds of birth control pills  What are the advantages of birth control pills? When birth control pills are used correctly, the chances of getting pregnant are very low  Birth control pills may help decrease bleeding and pain during your monthly period  They may also help prevent cancer of the uterus and ovaries  What are the disadvantages of birth control pills? You may have sudden changes in your mood or feelings while you take birth control pills  You may have nausea and decreased sex drive  You may have an increased appetite and rapid weight gain  You may also have bleeding in between periods, less frequent periods, vaginal dryness, and breast pain  Birth control pills will not protect you from sexually transmitted infections  Rarely, some birth control pills can increase your risk for a blood clot  This may become life-threatening  What should I do if I decide I want to get pregnant? If you are planning to have a baby, ask your healthcare provider when you may stop taking your birth control pills  It may take some time for you to start ovulating again  Ask your healthcare provider for more information about pregnancy after birth control pills  When should I start taking birth control pills after I have a baby? If you are not breastfeeding, you may start taking birth control pills 3 weeks after you give birth  You may be able to take certain types of birth control pills if you are breastfeeding   These pills can be started from 6 weeks to 6 months after you give birth  Ask your healthcare provider for more information about when to start taking birth control pills after you give birth  When should I contact my healthcare provider? · You have forgotten to take a birth control pill  · You have mood changes, such as depression, since starting birth control pills  · You have nausea or you are vomiting  · You have severe abdominal pain  · You missed a period and have questions or concerns about being pregnant  · You still have bleeding 4 months after taking birth control pills correctly  · You have questions or concerns about your condition or care  When should I seek immediate care or call 911? · Your arm or leg feels warm, tender, and painful  It may look swollen and red  · You feel lightheaded, short of breath, and have chest pain  · You cough up blood  · You have any of the following signs of a stroke:      ¨ Numbness or drooping on one side of your face     ¨ Weakness in an arm or leg    ¨ Confusion or difficulty speaking    ¨ Dizziness, a severe headache, or vision loss    · You have severe pain, numbness, or swelling in your arms or legs  CARE AGREEMENT:   You have the right to help plan your care  Learn about your health condition and how it may be treated  Discuss treatment options with your caregivers to decide what care you want to receive  You always have the right to refuse treatment  The above information is an  only  It is not intended as medical advice for individual conditions or treatments  Talk to your doctor, nurse or pharmacist before following any medical regimen to see if it is safe and effective for you  © 2017 [address] Information is for End User's use only and may not be sold, redistributed or otherwise used for commercial purposes   All illustrations and images included in CareNotes® are the copyrighted property of [employer]  or [employer]  Ethinyl Estradiol/Norgestimate (By mouth)   Ethinyl Estradiol (ETH-i-nil es-tra-DYE-ol), Norgestimate (sji-LWU-qf-mate)  Prevents pregnancy and treats acne  This medicine is commonly called a birth control pill  Brand Name(s): Estarylla, Femynor, Mono-Linyah, MonoNessa, Ortho Tri-Cyclen, Ortho Tri-Cyclen Lo, Ortho-Cyclen, Previfem, Sprintec, Tri-Estarylla, Tri-Linyah, Tri-Lo-Estarylla, Tri-Lo-Trinidad, Tri-Lo-Sprintec, Tri-Previfem   There may be other brand names for this medicine  When This Medicine Should Not Be Used: This medicine is not right for everyone  Do not use it if you had an allergic reaction to ethinyl estradiol or norgestimate, or if you are pregnant or have unusual vaginal bleeding that has not been checked by your doctor  Do not use it if you have liver disease, breast cancer, or problems with blood clots  Do not use it if you have high blood pressure, certain heart problems, or diabetes with kidney, eye, nerve, or blood vessel damage  How to Use This Medicine:   Tablet  · Your doctor will tell you how much medicine to use  Do not use more than directed  · Each brand of birth control pills has specific directions  Read and follow the patient instructions for your prescribed brand  Talk to your doctor or pharmacist if you have any questions  · Ask your doctor if you should use a second form of birth control for the first 7 days of your first cycle of pills  · Take your pill at the same time every day  Birth control pills work best when there is no more than 24 hours between doses  Keep the pills in the original container  Take the pills in the order they appear in the container  · Follow the instructions in the patient leaflet or call your doctor if you vomit or have diarrhea within 3 to 4 hours of taking this medicine  · Missed dose: Read and carefully follow the patient instructions if you miss a dose  You may need to use a second form of birth control for several days   Ask your doctor or pharmacist if you have any questions  · Store the medicine in the original package at room temperature, away from heat, moisture, and direct light  Drugs and Foods to Avoid:   Ask your doctor or pharmacist before using any other medicine, including over-the-counter medicines, vitamins, and herbal products  · Some foods and medicines can affect how birth control pills work  Tell your doctor if you are also using the following:   ¨ Acetaminophen, aprepitant, ascorbic acid, aspirin, atorvastatin, boceprevir, bosentan, clofibrate, colesevelam, cyclosporine, morphine, prednisolone, rifabutin, rifampicin, rosuvastatin, Momo's wort, telaprevir, temazepam, theophylline, tizanidine  ¨ Seizure medicine, including carbamazepine, felbamate, lamotrigine, oxcarbazepine, phenytoin, topiramate  ¨ Thyroid replacement medicine  ¨ Medicine to treat an infection, including fluconazole, itraconazole, ketoconazole, voriconazole  ¨ Protease inhibitor to treat HIV/AIDS  Warnings While Using This Medicine:   · Tell your doctor right away if you think you have become pregnant  If you miss 2 monthly periods in a row, call your doctor for a pregnancy test before you take any more pills  · Tell your doctor if you are breastfeeding, or if you had a baby within 4 weeks before you start using this medicine  Tell your doctor if you have cervical cancer, diabetes, epilepsy, gallbladder problems, migraine headaches, heart or blood vessel disease, high cholesterol, or a family history of breast cancer or depression  Tell your doctor if you smoke or have a history of chloasma (skin discoloration of the face), especially during pregnancy    · This medicine may cause the following problems:  ¨ Blood clots, which may lead to stroke or heart attack (risk is increased by cigarette smoking)  ¨ Possible increased risk of breast or cervical cancer  ¨ Liver cancer or tumors  ¨ Gallbladder disease  ¨ High blood pressure  · You might have spotting or irregular bleeding when you first start to use this medicine  You might have unplanned bleeding if you miss a dose or are late taking it  Call your doctor if you think there is a problem, such as if you have heavy bleeding  · You may need to stop using this medicine for a few weeks before and after you have surgery because of the risk of blood clots  · This medicine will not protect you from HIV/AIDS or other sexually transmitted diseases  · Tell any doctor or dentist who treats you that you are using this medicine  This medicine may affect certain medical test results  · Keep all medicine out of the reach of children  Never share your medicine with anyone  Possible Side Effects While Using This Medicine:   Call your doctor right away if you notice any of these side effects:  · Allergic reaction: Itching or hives, swelling in your face or hands, swelling or tingling in your mouth or throat, chest tightness, trouble breathing  · Chest pain, trouble breathing, or coughing up blood  · Nausea, unusual sweating, fainting  · Numbness or weakness on one side of your body, sudden or severe headache, problems with vision, speech, or walking  · Pain in your lower leg (calf)  · Unusual or unexpected vaginal bleeding or heavy bleeding  · Yellow skin or eyes  · Vision loss, double vision  If you notice these less serious side effects, talk with your doctor:   · Depression, mood changes  · Headaches  · Light spotting or bleeding between periods  If you notice other side effects that you think are caused by this medicine, tell your doctor  Call your doctor for medical advice about side effects  You may report side effects to FDA at 5-308-FDA-5933  © 2017 [address] [employer] Information is for End User's use only and may not be sold, redistributed or otherwise used for commercial purposes  The above information is an  only   It is not intended as medical advice for individual conditions or treatments  Talk to your doctor, nurse or pharmacist before following any medical regimen to see if it is safe and effective for you

## 2020-01-14 NOTE — PROGRESS NOTES
Assessment/Plan:      Diagnoses and all orders for this visit:    Abnormal uterine bleeding (AUB)  -     TSH, 3rd generation with Free T4 reflex; Future  -     norgestimate-ethinyl estradiol (ORTHO-CYCLEN) 0 25-35 MG-MCG per tablet; Take 1 tablet by mouth daily  -     medroxyPROGESTERone (DEPO-PROVERA) 150 mg/mL injection; Inject 1 mL (150 mg total) into a muscle every 3 (three) months    Other orders  -     medroxyPROGESTERone (DEPO-PROVERA) 150 mg/mL injection; Inject 150 mg into a muscle every 3 (three) months      -reviewed with patient no structural abnormality seen on ultrasound  -reviewed treatment of abnormal uterine bleeding with patient including NSAIDs, hormones and surgical options   -currently Depo-Provera effective through 3/29/20  -will order trial of Sprintec for 1 month to see if bleeding stops  -encouraged patient to get TSH with reflex drawn  -patient will return to office 3/2020 if satisfied with Depo-Provera will provide Depo-Provera 2  Injection at that time  If not satisfied will discuss options for staying on oral contraceptive  Patient is not interested in surgical intervention at this time  Encouraged patient to call office if bleeding returns after OCP is stopped  Patient verbalizes understanding   -common side effects of nausea, breast tenderness and irregular vaginal bleeding reviewed  ACHES reviewed  Patient will start [facility] today-quick start  Written information provided  -encouraged patient to avoid taking Lysteda while taking birth control pill  Patient verbalizes understanding  -reviewed possibility of endometrial biopsy-endometrium measures 14 mm, risk factors include  ethnicity and obesity patient verbalizes understanding  Declines procedure at this time given no insurance  RTO 3/2020 for follow-up    Subjective:     Patient ID: Alena Vo is a 46 y o  female  HPI  here for follow-up emergency department visit    Was seen in emergency room 1/5/2020 with complaints of heavy prolonged menses  Pelvic ultrasound reviewed-WNL  Labs reviewed significant for anemia H/H 7 8/28 7  Was given Depo-Provera prior to discharge and transanemic acid for home  Patient states menses have been abnormal since 9/2019  Menses would last 3-4 weeks would have 4-5 days bleed free then menses would return for 3-4 weeks  Patient states bleeding is very heavy necessitating pad changes every 2-3 hours  States heavy days are approximately 17-20 per bleed cycle of 3-4 weeks  Has tubal ligation for contraception  Denies new medications, vitamins or supplements  Admits to easy fatigue   Patient states she had 2 days of no bleeding after released from hospital with Depo-Provera stopped taking trans anemic acid and started bleeding again  Last Pap smear about 2 years ago-normal per patient    Review of Systems   Constitutional: Positive for fatigue  Negative for chills and fever  Respiratory: Negative  Cardiovascular: Negative  Gastrointestinal: Negative  Genitourinary: Positive for menstrual problem  Negative for decreased urine volume, difficulty urinating, dysuria, enuresis, flank pain, frequency, genital sores, hematuria, pelvic pain, urgency, vaginal bleeding, vaginal discharge and vaginal pain  Objective:     Physical Exam   Constitutional: She is oriented to person, place, and time  She appears well-developed and well-nourished  Cardiovascular: Normal rate, regular rhythm and normal heart sounds  Pulmonary/Chest: Effort normal and breath sounds normal    Genitourinary:   Genitourinary Comments: Patient declined given recent exam in emergency department   Neurological: She is alert and oriented to person, place, and time  Psychiatric: She has a normal mood and affect   Her behavior is normal  Thought content normal

## 2020-01-19 ENCOUNTER — HOSPITAL ENCOUNTER (EMERGENCY)
Facility: HOSPITAL | Age: 40
Discharge: HOME | End: 2020-01-19
Attending: EMERGENCY MEDICINE | Admitting: EMERGENCY MEDICINE

## 2020-01-19 VITALS
DIASTOLIC BLOOD PRESSURE: 77 MMHG | SYSTOLIC BLOOD PRESSURE: 117 MMHG | HEART RATE: 113 BPM | OXYGEN SATURATION: 100 % | RESPIRATION RATE: 22 BRPM | TEMPERATURE: 98.7 F

## 2020-01-19 DIAGNOSIS — J11.1 INFLUENZA-LIKE ILLNESS: Primary | ICD-10-CM

## 2020-01-19 PROCEDURE — 99282 EMERGENCY DEPT VISIT SF MDM: CPT | Performed by: PHYSICIAN ASSISTANT

## 2020-01-19 PROCEDURE — 99283 EMERGENCY DEPT VISIT LOW MDM: CPT

## 2020-01-20 NOTE — ED PROVIDER NOTES
History  Chief Complaint   Patient presents with    Cough     Pt presents to ED with cough and fever since friday      64-year-old female with cough congestion body aches rhinorrhea earache for the past 3 days  Multiple bladder workplace or diagnosed with flu  She complains primarily of her generalized myalgias  She has been eating less but drinking per usual   Taking Tylenol Motrin around the clock  No vomiting  Normal bowel movements normal urination  No trouble breathing  History provided by:  Patient   used: No    Flu Symptoms   Presenting symptoms: cough, fatigue, fever, headache, myalgias, rhinorrhea and sore throat    Presenting symptoms: no diarrhea, no nausea, no shortness of breath and no vomiting    Severity:  Moderate  Onset quality:  Gradual  Progression:  Unchanged  Chronicity:  New  Relieved by:  Nothing  Worsened by:  Nothing  Ineffective treatments:  None tried  Associated symptoms: chills, decreased appetite, ear pain and nasal congestion    Associated symptoms: no decrease in physical activity, no mental status change, no neck stiffness and no syncope    Risk factors: sick contacts        Prior to Admission Medications   Prescriptions Last Dose Informant Patient Reported? Taking?    Iron 15 MG/1 5ML SUSP   Yes No   Sig: Take by mouth   ibuprofen (MOTRIN) 600 mg tablet   Yes No   Sig: Take by mouth every 6 (six) hours as needed for mild pain   medroxyPROGESTERone (DEPO-PROVERA) 150 mg/mL injection   Yes No   Sig: Inject 150 mg into a muscle every 3 (three) months   medroxyPROGESTERone (DEPO-PROVERA) 150 mg/mL injection   No No   Sig: Inject 1 mL (150 mg total) into a muscle every 3 (three) months   norgestimate-ethinyl estradiol (ORTHO-CYCLEN) 0 25-35 MG-MCG per tablet   No No   Sig: Take 1 tablet by mouth daily      Facility-Administered Medications: None       Past Medical History:   Diagnosis Date    CTS (carpal tunnel syndrome)     Hemorrhoids        Past Surgical History:   Procedure Laterality Date    CARPAL TUNNEL RELEASE       SECTION      TUBAL LIGATION         History reviewed  No pertinent family history  I have reviewed and agree with the history as documented  Social History     Tobacco Use    Smoking status: Never Smoker    Smokeless tobacco: Never Used   Substance Use Topics    Alcohol use: No    Drug use: No        Review of Systems   Constitutional: Positive for chills, decreased appetite, fatigue and fever  Negative for activity change, appetite change, diaphoresis and unexpected weight change  HENT: Positive for congestion, ear pain, rhinorrhea and sore throat  Negative for sinus pressure and trouble swallowing  Eyes: Negative for photophobia and visual disturbance  Respiratory: Positive for cough  Negative for apnea, choking, chest tightness, shortness of breath, wheezing and stridor  Cardiovascular: Negative for chest pain, palpitations and leg swelling  Gastrointestinal: Negative for abdominal distention, abdominal pain, blood in stool, constipation, diarrhea, nausea and vomiting  Genitourinary: Negative for decreased urine volume, difficulty urinating, dysuria, enuresis, flank pain, frequency, hematuria and urgency  Musculoskeletal: Positive for myalgias  Negative for arthralgias, neck pain and neck stiffness  Skin: Negative for color change, pallor, rash and wound  Allergic/Immunologic: Negative  Neurological: Positive for headaches  Negative for dizziness, tremors, syncope, weakness, light-headedness and numbness  Hematological: Negative  Psychiatric/Behavioral: Negative  All other systems reviewed and are negative  Physical Exam  Physical Exam   Constitutional: She is oriented to person, place, and time  She appears well-developed and well-nourished  Non-toxic appearance  She does not have a sickly appearance  She does not appear ill  No distress     HENT:   Head: Normocephalic and atraumatic  Right Ear: Hearing, tympanic membrane, external ear and ear canal normal    Left Ear: Hearing, tympanic membrane, external ear and ear canal normal    Nose: Rhinorrhea present  Mouth/Throat: Uvula is midline, oropharynx is clear and moist and mucous membranes are normal    Eyes: Pupils are equal, round, and reactive to light  EOM and lids are normal    Neck: Normal range of motion  Neck supple  Cardiovascular: Normal rate, regular rhythm, S1 normal, S2 normal, normal heart sounds, intact distal pulses and normal pulses  Exam reveals no gallop, no distant heart sounds, no friction rub and no decreased pulses  No murmur heard  Pulses:       Radial pulses are 2+ on the right side, and 2+ on the left side  Pulmonary/Chest: Effort normal and breath sounds normal  No accessory muscle usage  No apnea, no tachypnea and no bradypnea  No respiratory distress  She has no decreased breath sounds  She has no wheezes  She has no rhonchi  She has no rales  Abdominal: Soft  Normal appearance  She exhibits no distension  There is no tenderness  There is no rigidity, no rebound and no guarding  Musculoskeletal: Normal range of motion  She exhibits no edema, tenderness or deformity  Neurological: She is alert and oriented to person, place, and time  No cranial nerve deficit  GCS eye subscore is 4  GCS verbal subscore is 5  GCS motor subscore is 6  Skin: Skin is warm, dry and intact  No rash noted  She is not diaphoretic  No erythema  No pallor  Psychiatric: Her speech is normal    Nursing note and vitals reviewed        Vital Signs  ED Triage Vitals [01/19/20 1855]   Temperature Pulse Respirations Blood Pressure SpO2   98 7 °F (37 1 °C) (!) 113 22 117/77 100 %      Temp Source Heart Rate Source Patient Position - Orthostatic VS BP Location FiO2 (%)   Oral Monitor Lying Right arm --      Pain Score       No Pain           Vitals:    01/19/20 1855   BP: 117/77   Pulse: (!) 113   Patient Position - Orthostatic VS: Lying         Visual Acuity      ED Medications  Medications - No data to display    Diagnostic Studies  Results Reviewed     None                 No orders to display              Procedures  Procedures         ED Course                               MDM  Number of Diagnoses or Management Options  Influenza-like illness: new and requires workup  Diagnosis management comments: Differential diagnosis includes but is not limited to flu, pneumonia, bronchitis, viral syndrome, pharyngitis  Risk of Complications, Morbidity, and/or Mortality  Presenting problems: low  Management options: low  General comments: Plan/medical decision [age]year-old with flu-like symptoms  Exam and history consistent with influenza like illness  Outside the window for Tamiflu  Recommended conservative management  Defer testing which patient agreed with  Patient understands and agrees with plan  Patient Progress  Patient progress: stable        Disposition  Final diagnoses:   Influenza-like illness     Time reflects when diagnosis was documented in both MDM as applicable and the Disposition within this note     Time User Action Codes Description Comment    2020  7:04 PM Candida Osborne [address] illness       ED Disposition     ED Disposition Condition Date/Time Comment    Discharge Stable Sun 2020  7:04 PM Sunni Pean discharge to home/self care              Follow-up Information     Follow up With Specialties Details Why Contact Info Additional  [facility] Emergency Department Emergency Medicine Go to  If symptoms worsen [address] [facility] [id number]  [address] [place] [address] ED, [address] [place], [place], [address]          Discharge Medication List as of 2020  7:04 PM      CONTINUE these medications which have NOT CHANGED    Details   ibuprofen (MOTRIN) 600 mg tablet Take by mouth every 6 (six) hours as needed for mild pain, Historical Med      Iron 15 MG/1 5ML SUSP Take by mouth, Historical Med      !! medroxyPROGESTERone (DEPO-PROVERA) 150 mg/mL injection Inject 150 mg into a muscle every 3 (three) months, Historical Med      !! medroxyPROGESTERone (DEPO-PROVERA) 150 mg/mL injection Inject 1 mL (150 mg total) into a muscle every 3 (three) months, Starting Tue 3/3/2020, Normal      norgestimate-ethinyl estradiol (ORTHO-CYCLEN) 0 25-35 MG-MCG per tablet Take 1 tablet by mouth daily, Starting Tue 1/14/2020, Normal       !! - Potential duplicate medications found  Please discuss with provider  No discharge procedures on file      ED Provider  Electronically Signed by           Elvie Moon PA-C  01/19/20 2028

## 2020-03-10 ENCOUNTER — APPOINTMENT (OUTPATIENT)
Dept: LAB | Facility: HOSPITAL | Age: 40
End: 2020-03-10

## 2020-03-10 DIAGNOSIS — N93.9 ABNORMAL UTERINE BLEEDING (AUB): ICD-10-CM

## 2020-03-10 LAB — TSH SERPL DL<=0.05 MIU/L-ACNC: 1.53 UIU/ML (ref 0.36–3.74)

## 2020-03-10 PROCEDURE — 84443 ASSAY THYROID STIM HORMONE: CPT

## 2020-03-10 PROCEDURE — 36415 COLL VENOUS BLD VENIPUNCTURE: CPT

## 2020-03-11 ENCOUNTER — TRANSCRIBE ORDERS (OUTPATIENT)
Dept: LAB | Facility: CLINIC | Age: 40
End: 2020-03-11

## 2020-03-11 ENCOUNTER — LAB (OUTPATIENT)
Dept: LAB | Facility: CLINIC | Age: 40
End: 2020-03-11
Payer: COMMERCIAL

## 2020-03-11 ENCOUNTER — OFFICE VISIT (OUTPATIENT)
Dept: OBGYN CLINIC | Facility: CLINIC | Age: 40
End: 2020-03-11

## 2020-03-11 VITALS — SYSTOLIC BLOOD PRESSURE: 124 MMHG | DIASTOLIC BLOOD PRESSURE: 80 MMHG | WEIGHT: 255 LBS | BODY MASS INDEX: 39.94 KG/M2

## 2020-03-11 DIAGNOSIS — N93.9 ABNORMAL UTERINE BLEEDING: ICD-10-CM

## 2020-03-11 DIAGNOSIS — N93.9 ABNORMAL UTERINE BLEEDING: Primary | ICD-10-CM

## 2020-03-11 LAB
BASOPHILS # BLD AUTO: 0.04 THOUSANDS/ΜL (ref 0–0.1)
BASOPHILS NFR BLD AUTO: 0 % (ref 0–1)
EOSINOPHIL # BLD AUTO: 0.25 THOUSAND/ΜL (ref 0–0.61)
EOSINOPHIL NFR BLD AUTO: 3 % (ref 0–6)
ERYTHROCYTE [DISTWIDTH] IN BLOOD BY AUTOMATED COUNT: 17.8 % (ref 11.6–15.1)
HCT VFR BLD AUTO: 35 % (ref 34.8–46.1)
HGB BLD-MCNC: 9.9 G/DL (ref 11.5–15.4)
IMM GRANULOCYTES # BLD AUTO: 0.03 THOUSAND/UL (ref 0–0.2)
IMM GRANULOCYTES NFR BLD AUTO: 0 % (ref 0–2)
LYMPHOCYTES # BLD AUTO: 2.89 THOUSANDS/ΜL (ref 0.6–4.47)
LYMPHOCYTES NFR BLD AUTO: 30 % (ref 14–44)
MCH RBC QN AUTO: 21.9 PG (ref 26.8–34.3)
MCHC RBC AUTO-ENTMCNC: 28.3 G/DL (ref 31.4–37.4)
MCV RBC AUTO: 77 FL (ref 82–98)
MONOCYTES # BLD AUTO: 0.47 THOUSAND/ΜL (ref 0.17–1.22)
MONOCYTES NFR BLD AUTO: 5 % (ref 4–12)
NEUTROPHILS # BLD AUTO: 6.02 THOUSANDS/ΜL (ref 1.85–7.62)
NEUTS SEG NFR BLD AUTO: 62 % (ref 43–75)
NRBC BLD AUTO-RTO: 0 /100 WBCS
PLATELET # BLD AUTO: 556 THOUSANDS/UL (ref 149–390)
PMV BLD AUTO: 8.9 FL (ref 8.9–12.7)
RBC # BLD AUTO: 4.52 MILLION/UL (ref 3.81–5.12)
WBC # BLD AUTO: 9.7 THOUSAND/UL (ref 4.31–10.16)

## 2020-03-11 PROCEDURE — 85025 COMPLETE CBC W/AUTO DIFF WBC: CPT

## 2020-03-11 PROCEDURE — 36415 COLL VENOUS BLD VENIPUNCTURE: CPT

## 2020-03-11 PROCEDURE — G0145 SCR C/V CYTO,THINLAYER,RESCR: HCPCS | Performed by: PHYSICIAN ASSISTANT

## 2020-03-11 PROCEDURE — 99213 OFFICE O/P EST LOW 20 MIN: CPT | Performed by: PHYSICIAN ASSISTANT

## 2020-03-11 NOTE — ASSESSMENT & PLAN NOTE
Reviewed prolonged abnormal bleeding with patient  Concerned with how much she has been bleeding and Hgb being 7 8 two months ago  Pap smear done today  Patient aware of cost    Reviewed case in length with Dr Antonella Kline  At this secondary to not having insurance recommend patient consider transferring care to [address] [place] [address] for better resources  Will plan to get updated CBC, patient will go today for blood work  Aware if blood count too low will likely need to go to ER and consider blood transfusion  Aware of s/s to go to ER with  Reviewed options in length with patient including medical and surgical options  Patient would like to consider uterine ablation at this time  Patient will plan to follow up with [facility] and will work on getting health insurance so she can proceed with ablation

## 2020-03-11 NOTE — PROGRESS NOTES
Assessment/Plan:    Abnormal uterine bleeding  Reviewed prolonged abnormal bleeding with patient  Concerned with how much she has been bleeding and Hgb being 7 8 two months ago  Pap smear done today  Patient aware of cost    Reviewed case in length with Dr Dusty Sánchez  At this secondary to not having insurance recommend patient consider transferring care to [address] [place] [address] for better resources  Will plan to get updated CBC, patient will go today for blood work  Aware if blood count too low will likely need to go to ER and consider blood transfusion  Aware of s/s to go to ER with  Reviewed options in length with patient including medical and surgical options  Patient would like to consider uterine ablation at this time  Patient will plan to follow up with [facility] and will work on getting health insurance so she can proceed with ablation  Problem List Items Addressed This Visit        Genitourinary    Abnormal uterine bleeding - Primary     Reviewed prolonged abnormal bleeding with patient  Concerned with how much she has been bleeding and Hgb being 7 8 two months ago  Pap smear done today  Patient aware of cost    Reviewed case in length with Dr Dusty Sánchez  At this secondary to not having insurance recommend patient consider transferring care to [address] [place] [address] for better resources  Will plan to get updated CBC, patient will go today for blood work  Aware if blood count too low will likely need to go to ER and consider blood transfusion  Aware of s/s to go to ER with  Reviewed options in length with patient including medical and surgical options  Patient would like to consider uterine ablation at this time  Patient will plan to follow up with [facility] and will work on getting health insurance so she can proceed with ablation            Relevant Orders    CBC and differential (Completed)    Liquid-based pap, screening            Subjective:      Patient ID: Brandon Bonner is a 46 y o  female  HPI  45 yo seen for continued prolonged bleeding  Patient was seen in the ER 9/2019 for vaginal pressure and discomfort  Had just had a menses 2 weeks ago which was very light  Pelvic ultrasound showed thickened stripe of 26mm  Reports was treated for suspected vaginal infection in ER and soon after started to have heavy bleeding  Bleeding has continued since  Reports will get lighter and then heavier again, has not completely stopped  Was seen in ER again on 1/5/2020 at that time her Hgb was 7 8  Was given Depo Provera in the hospital and discharged with transanemic acid x 5 days  Helped a little but returned to heavy  Saw Samuel MICHELLE on 1/14/2020  Was given script for Sprintec OCP  Patient reports took for 2 weeks initially slowed down bleeding but at 2 weeks bleeding started back up very heavy again  Concerned that the pill was worsening her bleeding she decided to discontinue  Reports had a refill from the ER for Depo Provera picked it up and had her partner inject her about 2-3 weeks ago  Reports still bleeding  The only thing that helps is if she takes 800 mg Ibuprofen around the clock  Reports will slow down for a couple of hours and then will get heavy again changing pad every 1-2 hrs  Has been fatigued, more short of breath  Reports bleeding is painless, just has pressure in vaginal prior to passing large clot  Recent TSH done yesterday was normal    Does not want to continue Depo as she has noticed weight gain from it  Patient has history of tubal ligation, would like more definitive surgical treatment  Believes she had a pap smear 2 years ago, no information on file  Of note patient currently does not have any insurance, is working to sign up to see if she is qualified for [facility]  The following portions of the patient's history were reviewed and updated as appropriate:   She  has a past medical history of CTS (carpal tunnel syndrome) and Hemorrhoids    She Patient Active Problem List    Diagnosis Date Noted    Abnormal uterine bleeding 2020    Abnormal uterine bleeding (AUB) 2020     She  has a past surgical history that includes  section; Carpal tunnel release; and Tubal ligation  Her family history is not on file  She  reports that she has never smoked  She has never used smokeless tobacco  She reports that she does not drink alcohol or use drugs  Current Outpatient Medications   Medication Sig Dispense Refill    ibuprofen (MOTRIN) 600 mg tablet Take by mouth every 6 (six) hours as needed for mild pain      Iron 15 MG/1 5ML SUSP Take by mouth      medroxyPROGESTERone (DEPO-PROVERA) 150 mg/mL injection Inject 150 mg into a muscle every 3 (three) months      medroxyPROGESTERone (DEPO-PROVERA) 150 mg/mL injection Inject 1 mL (150 mg total) into a muscle every 3 (three) months 1 mL 0    norgestimate-ethinyl estradiol (ORTHO-CYCLEN) 0 25-35 MG-MCG per tablet Take 1 tablet by mouth daily 30 tablet 0     No current facility-administered medications for this visit  She is allergic to wound dressing adhesive and latex       Review of Systems   Constitutional: Positive for fatigue and unexpected weight change  Negative for fever  HENT: Negative for dental problem and sinus pressure  Eyes: Negative for visual disturbance  Respiratory: Negative for cough, shortness of breath and wheezing  Cardiovascular: Negative for chest pain  Gastrointestinal: Negative for abdominal pain, blood in stool, constipation, diarrhea, nausea and vomiting  Endocrine: Negative for polydipsia  Genitourinary: Positive for menstrual problem  Negative for difficulty urinating, dyspareunia, dysuria, frequency, hematuria, pelvic pain and urgency  Musculoskeletal: Negative for arthralgias and back pain  Neurological: Positive for light-headedness  Negative for dizziness, seizures and headaches  Psychiatric/Behavioral: Negative for suicidal ideas   The patient is not nervous/anxious  Objective:      /80 (BP Location: Right arm, Patient Position: Sitting, Cuff Size: Large)   Wt 116 kg (255 lb)   BMI 39 94 kg/m²          Physical Exam   Constitutional: She is oriented to person, place, and time  She appears well-developed and well-nourished  Neck: Neck supple  No thyroid mass and no thyromegaly present  Cardiovascular: Normal rate, regular rhythm and normal heart sounds  Exam reveals no gallop and no friction rub  No murmur heard  Pulmonary/Chest: Effort normal and breath sounds normal  No respiratory distress  She has no wheezes  She has no rales  Abdominal: Soft  Normal appearance and bowel sounds are normal  She exhibits no distension and no mass  There is no tenderness  There is no rebound and no guarding  Genitourinary: No breast tenderness, discharge or bleeding  There is no rash, tenderness, lesion or injury on the right labia  There is no rash, tenderness, lesion or injury on the left labia  Uterus is not deviated, not enlarged and not tender  Cervix exhibits no motion tenderness, no discharge and no friability  Right adnexum displays no mass, no tenderness and no fullness  Left adnexum displays no mass, no tenderness and no fullness  No erythema, tenderness or bleeding in the vagina  No signs of injury around the vagina  No vaginal discharge found  Lymphadenopathy:     She has no cervical adenopathy  Right: No inguinal and no supraclavicular adenopathy present  Left: No inguinal and no supraclavicular adenopathy present  Neurological: She is alert and oriented to person, place, and time  Skin: Skin is warm and dry  No rash noted  No erythema  No pallor  Psychiatric: She has a normal mood and affect   Her behavior is normal  Judgment and thought content normal

## 2020-03-12 ENCOUNTER — TELEPHONE (OUTPATIENT)
Dept: OBGYN CLINIC | Facility: CLINIC | Age: 40
End: 2020-03-12

## 2020-03-17 LAB
LAB AP GYN PRIMARY INTERPRETATION: NORMAL
Lab: NORMAL

## 2020-03-19 ENCOUNTER — HOSPITAL ENCOUNTER (EMERGENCY)
Facility: HOSPITAL | Age: 40
Discharge: HOME | End: 2020-03-19
Attending: EMERGENCY MEDICINE | Admitting: EMERGENCY MEDICINE

## 2020-03-19 VITALS
OXYGEN SATURATION: 99 % | HEART RATE: 104 BPM | WEIGHT: 245 LBS | HEIGHT: 67 IN | TEMPERATURE: 98.3 F | BODY MASS INDEX: 38.45 KG/M2 | DIASTOLIC BLOOD PRESSURE: 74 MMHG | SYSTOLIC BLOOD PRESSURE: 112 MMHG | RESPIRATION RATE: 22 BRPM

## 2020-03-19 DIAGNOSIS — B34.9 VIRAL SYNDROME: ICD-10-CM

## 2020-03-19 DIAGNOSIS — R05.9 COUGH: Primary | ICD-10-CM

## 2020-03-19 DIAGNOSIS — R50.9 FEVER: ICD-10-CM

## 2020-03-19 PROCEDURE — 99284 EMERGENCY DEPT VISIT MOD MDM: CPT | Performed by: NURSE PRACTITIONER

## 2020-03-19 PROCEDURE — 99282 EMERGENCY DEPT VISIT SF MDM: CPT

## 2020-03-19 RX ORDER — DEXTROMETHORPHAN HYDROBROMIDE AND PROMETHAZINE HYDROCHLORIDE 15; 6.25 MG/5ML; MG/5ML
5 SOLUTION ORAL 4 TIMES DAILY PRN
Qty: 118 ML | Refills: 0 | Status: SHIPPED | OUTPATIENT
Start: 2020-03-19 | End: 2020-03-24

## 2020-03-19 RX ORDER — ALBUTEROL SULFATE 90 UG/1
2 AEROSOL, METERED RESPIRATORY (INHALATION) EVERY 6 HOURS PRN
Qty: 1 INHALER | Refills: 0 | Status: SHIPPED | OUTPATIENT
Start: 2020-03-19 | End: 2020-03-29

## 2020-03-19 RX ORDER — ACETAMINOPHEN 325 MG/1
975 TABLET ORAL ONCE
Status: COMPLETED | OUTPATIENT
Start: 2020-03-19 | End: 2020-03-19

## 2020-03-19 RX ORDER — BENZONATATE 100 MG/1
100 CAPSULE ORAL ONCE
Status: COMPLETED | OUTPATIENT
Start: 2020-03-19 | End: 2020-03-19

## 2020-03-19 RX ORDER — ACETAMINOPHEN 500 MG
500 TABLET ORAL EVERY 6 HOURS PRN
Qty: 20 TABLET | Refills: 0 | Status: SHIPPED | OUTPATIENT
Start: 2020-03-19 | End: 2020-03-24

## 2020-03-19 RX ADMIN — ACETAMINOPHEN 975 MG: 325 TABLET, FILM COATED ORAL at 11:55

## 2020-03-19 RX ADMIN — BENZONATATE 100 MG: 100 CAPSULE ORAL at 11:55

## 2020-03-19 NOTE — ED PROVIDER NOTES
History  Chief Complaint   Patient presents with    Cough     Cough, sore throat, fevers on and off since two days  Travels to [place] everyday       Cough   Cough characteristics:  Dry and non-productive  Timing:  Constant  Chronicity:  New  Context: sick contacts and upper respiratory infection    Relieved by:  None tried  Worsened by:  Nothing  Associated symptoms: fever, sinus congestion and sore throat    Associated symptoms: no ear pain, no headaches, no myalgias, no rhinorrhea, no shortness of breath and no wheezing        Prior to Admission Medications   Prescriptions Last Dose Informant Patient Reported? Taking? Iron 15 MG/1 5ML SUSP   Yes No   Sig: Take by mouth   ibuprofen (MOTRIN) 600 mg tablet   Yes No   Sig: Take by mouth every 6 (six) hours as needed for mild pain   medroxyPROGESTERone (DEPO-PROVERA) 150 mg/mL injection   Yes No   Sig: Inject 150 mg into a muscle every 3 (three) months   medroxyPROGESTERone (DEPO-PROVERA) 150 mg/mL injection   No No   Sig: Inject 1 mL (150 mg total) into a muscle every 3 (three) months   norgestimate-ethinyl estradiol (ORTHO-CYCLEN) 0 25-35 MG-MCG per tablet   No No   Sig: Take 1 tablet by mouth daily      Facility-Administered Medications: None       Past Medical History:   Diagnosis Date    CTS (carpal tunnel syndrome)     Hemorrhoids        Past Surgical History:   Procedure Laterality Date    CARPAL TUNNEL RELEASE       SECTION      TUBAL LIGATION         History reviewed  No pertinent family history  I have reviewed and agree with the history as documented  E-Cigarette/Vaping     E-Cigarette/Vaping Substances     Social History     Tobacco Use    Smoking status: Never Smoker    Smokeless tobacco: Never Used   Substance Use Topics    Alcohol use: No    Drug use: No       Review of Systems   Constitutional: Positive for fever  Negative for activity change and fatigue  HENT: Positive for sore throat   Negative for congestion, ear pain and rhinorrhea  Eyes: Negative  Respiratory: Positive for cough  Negative for shortness of breath and wheezing  Gastrointestinal: Negative for abdominal pain, diarrhea, nausea and vomiting  Endocrine: Negative  Genitourinary: Negative for difficulty urinating, dyspareunia, dysuria, flank pain, frequency, menstrual problem, pelvic pain, urgency, vaginal bleeding, vaginal discharge and vaginal pain  Musculoskeletal: Negative for arthralgias and myalgias  Skin: Negative for color change and pallor  Neurological: Negative for dizziness, speech difficulty, weakness and headaches  Hematological: Negative for adenopathy  Psychiatric/Behavioral: Negative for confusion  Physical Exam  Physical Exam   Constitutional: She is oriented to person, place, and time  She appears well-developed and well-nourished  She is cooperative  Non-toxic appearance  She does not have a sickly appearance  She does not appear ill  No distress  HENT:   Head: Normocephalic and atraumatic  Right Ear: Tympanic membrane normal    Left Ear: Tympanic membrane normal    Nose: No rhinorrhea, sinus tenderness or nasal deformity  No epistaxis  Right sinus exhibits no maxillary sinus tenderness and no frontal sinus tenderness  Left sinus exhibits no maxillary sinus tenderness and no frontal sinus tenderness  Mouth/Throat: Mucous membranes are normal  Normal dentition  Eyes: EOM are normal  Right eye exhibits no discharge  Left eye exhibits no discharge  Neck: Normal range of motion  Neck supple  Cardiovascular: Normal rate and regular rhythm  Pulmonary/Chest: Effort normal  No accessory muscle usage  No respiratory distress  Abdominal: She exhibits no distension  There is no guarding  Musculoskeletal: Normal range of motion  She exhibits no edema  Neurological: She is alert and oriented to person, place, and time  Coordination normal    Skin: Skin is warm and dry  Psychiatric: She has a normal mood and affect  Nursing note and vitals reviewed  Vital Signs  ED Triage Vitals [03/19/20 1125]   Temperature Pulse Respirations Blood Pressure SpO2   98 3 °F (36 8 °C) 104 22 112/74 99 %      Temp src Heart Rate Source Patient Position - Orthostatic VS BP Location FiO2 (%)   -- Monitor -- -- --      Pain Score       --           Vitals:    03/19/20 1125   BP: 112/74   Pulse: 104         Visual Acuity      ED Medications  Medications   acetaminophen (TYLENOL) tablet 975 mg (975 mg Oral Given 3/19/20 1155)   benzonatate (TESSALON PERLES) capsule 100 mg (100 mg Oral Given 3/19/20 1155)       Diagnostic Studies  Results Reviewed     None                 No orders to display              Procedures  Procedures         ED Course                                 MDM  Number of Diagnoses or Management Options  Cough: new and requires workup  Fever: new and requires workup  Viral syndrome: new and requires workup  Patient Progress  Patient progress: stable        Disposition  Final diagnoses:   Cough   Fever   Viral syndrome     Time reflects when diagnosis was documented in both MDM as applicable and the Disposition within this note     Time User Action Codes Description Comment    3/19/2020 11:37 AM Sims Comas Add [R05] Cough     3/19/2020 11:37 AM Sims Comas Add [R50 9] Fever     3/19/2020 11:37 AM Sims Comas Add [B34 9] Viral syndrome       ED Disposition     ED Disposition Condition Date/Time Comment    Discharge Stable Thu Mar 19, 2020 11:37 AM Brandon Bonner discharge to home/self care              Follow-up Information    None         Discharge Medication List as of 3/19/2020 11:40 AM      START taking these medications    Details   acetaminophen (TYLENOL) 500 mg tablet Take 1 tablet (500 mg total) by mouth every 6 (six) hours as needed for mild pain or fever for up to 5 days, Starting Thu 3/19/2020, Until Tue 3/24/2020, Normal      albuterol (PROVENTIL HFA,VENTOLIN HFA) 90 mcg/act inhaler Inhale 2 puffs every 6 (six) hours as needed for wheezing for up to 10 days, Starting Thu 3/19/2020, Until Sun 3/29/2020, Normal      Promethazine-DM (PHENERGAN-DM) 6 25-15 mg/5 mL oral syrup Take 5 mL by mouth 4 (four) times a day as needed for cough for up to 5 days, Starting Thu 3/19/2020, Until Tue 3/24/2020, Normal         CONTINUE these medications which have NOT CHANGED    Details   ibuprofen (MOTRIN) 600 mg tablet Take by mouth every 6 (six) hours as needed for mild pain, Historical Med      Iron 15 MG/1 5ML SUSP Take by mouth, Historical Med      !! medroxyPROGESTERone (DEPO-PROVERA) 150 mg/mL injection Inject 150 mg into a muscle every 3 (three) months, Historical Med      !! medroxyPROGESTERone (DEPO-PROVERA) 150 mg/mL injection Inject 1 mL (150 mg total) into a muscle every 3 (three) months, Starting Tue 3/3/2020, Normal      norgestimate-ethinyl estradiol (ORTHO-CYCLEN) 0 25-35 MG-MCG per tablet Take 1 tablet by mouth daily, Starting Tue 1/14/2020, Normal       !! - Potential duplicate medications found  Please discuss with provider  No discharge procedures on file      PDMP Review     None          ED Provider  Electronically Signed by           MIGUEL ANGEL [place]  03/19/20 8465

## 2020-03-19 NOTE — DISCHARGE INSTRUCTIONS
[address]    Your healthcare provider and/or public health staff have evaluated you and have determined that you do not need to be hospitalized at this time  At this time you can be isolated at home where you will be monitored by staff from your local or state health department  You should carefully follow the prevention and isolation steps below until a healthcare provider or local or state health department says that you can return to your normal activities  Stay home except to get medical care    People who are mildly ill with COVID-19 are able to isolate at home during their illness  You should restrict activities outside your home, except for getting medical care  Do not go to work, school, or public areas  Avoid using public transportation, ride-sharing, or taxis  Separate yourself from other people and animals in your home    People: As much as possible, you should stay in a specific room and away from other people in your home  Also, you should use a separate bathroom, if available  Animals: You should restrict contact with pets and other animals while you are sick with COVID-19, just like you would around other people  Although there have not been reports of pets or other animals becoming sick with COVID-19, it is still recommended that people sick with COVID-19 limit contact with animals until more information is known about the virus  When possible, have another member of your household care for your animals while you are sick  If you are sick with COVID-19, avoid contact with your pet, including petting, snuggling, being kissed or licked, and sharing food  If you must care for your pet or be around animals while you are sick, wash your hands before and after you interact with pets and wear a facemask  See COVID-19 and Animals for more information      Call ahead before visiting your doctor    If you have a medical appointment, call the healthcare provider and tell them that you have or may have COVID-19  This will help the healthcare providers office take steps to keep other people from getting infected or exposed  Wear a facemask    You should wear a facemask when you are around other people (e g , sharing a room or vehicle) or pets and before you enter a healthcare providers office  If you are not able to wear a facemask (for example, because it causes trouble breathing), then people who live with you should not stay in the same room with you, or they should wear a facemask if they enter your room  Cover your coughs and sneezes    Cover your mouth and nose with a tissue when you cough or sneeze  Throw used tissues in a lined trash can  Immediately wash your hands with soap and water for at least 20 seconds or, if soap and water are not available, clean your hands with an alcohol-based hand  that contains at least 60% alcohol  Clean your hands often    Wash your hands often with soap and water for at least 20 seconds, especially after blowing your nose, coughing, or sneezing; going to the bathroom; and before eating or preparing food  If soap and water are not readily available, use an alcohol-based hand  with at least 60% alcohol, covering all surfaces of your hands and rubbing them together until they feel dry  Soap and water are the best option if hands are visibly dirty  Avoid touching your eyes, nose, and mouth with unwashed hands  Avoid sharing personal household items    You should not share dishes, drinking glasses, cups, eating utensils, towels, or bedding with other people or pets in your home  After using these items, they should be washed thoroughly with soap and water  Clean all high-touch surfaces everyday    High touch surfaces include counters, tabletops, doorknobs, bathroom fixtures, toilets, phones, keyboards, tablets, and bedside tables  Also, clean any surfaces that may have blood, stool, or body fluids on them   Use a household cleaning spray or wipe, according to the label instructions  Labels contain instructions for safe and effective use of the cleaning product including precautions you should take when applying the product, such as wearing gloves and making sure you have good ventilation during use of the product  Monitor your symptoms    Seek prompt medical attention if your illness is worsening (e g , difficulty breathing)  Before seeking care, call your healthcare provider and tell them that you have, or are being evaluated for, COVID-19  Put on a facemask before you enter the facility  These steps will help the healthcare providers office to keep other people in the office or waiting room from getting infected or exposed  Ask your healthcare provider to call the local or state health department  Persons who are placed under active monitoring or facilitated self-monitoring should follow instructions provided by their local health department or occupational health professionals, as appropriate  If you have a medical emergency and need to call 911, notify the dispatch personnel that you have, or are being evaluated for COVID-19  If possible, put on a facemask before emergency medical services arrive  Discontinuing home isolation    Patients with confirmed COVID-19 should remain under home isolation precautions until the risk of secondary transmission to others is thought to be low  The decision to discontinue home isolation precautions should be made on a case-by-case basis, in consultation with healthcare providers and state and local health departments      Source: [employer]

## 2020-10-26 ENCOUNTER — APPOINTMENT (EMERGENCY)
Dept: RADIOLOGY | Facility: HOSPITAL | Age: 40
End: 2020-10-26
Payer: COMMERCIAL

## 2020-10-26 ENCOUNTER — HOSPITAL ENCOUNTER (EMERGENCY)
Facility: HOSPITAL | Age: 40
Discharge: HOME | End: 2020-10-26
Attending: EMERGENCY MEDICINE | Admitting: EMERGENCY MEDICINE
Payer: COMMERCIAL

## 2020-10-26 VITALS
SYSTOLIC BLOOD PRESSURE: 126 MMHG | WEIGHT: 240 LBS | RESPIRATION RATE: 18 BRPM | HEART RATE: 78 BPM | DIASTOLIC BLOOD PRESSURE: 67 MMHG | HEIGHT: 67 IN | BODY MASS INDEX: 37.67 KG/M2 | OXYGEN SATURATION: 98 %

## 2020-10-26 DIAGNOSIS — M62.830 LUMBAR PARASPINAL MUSCLE SPASM: Primary | ICD-10-CM

## 2020-10-26 PROCEDURE — 72100 X-RAY EXAM L-S SPINE 2/3 VWS: CPT

## 2020-10-26 PROCEDURE — 99283 EMERGENCY DEPT VISIT LOW MDM: CPT

## 2020-10-26 PROCEDURE — 99284 EMERGENCY DEPT VISIT MOD MDM: CPT | Performed by: PHYSICIAN ASSISTANT

## 2020-10-26 PROCEDURE — 96372 THER/PROPH/DIAG INJ SC/IM: CPT

## 2020-10-26 RX ORDER — ACETAMINOPHEN 325 MG/1
1000 TABLET ORAL EVERY 6 HOURS PRN
COMMUNITY

## 2020-10-26 RX ORDER — DIAZEPAM 5 MG/1
5 TABLET ORAL EVERY 6 HOURS PRN
Qty: 12 TABLET | Refills: 0 | Status: SHIPPED | OUTPATIENT
Start: 2020-10-26 | End: 2020-10-29

## 2020-10-26 RX ORDER — KETOROLAC TROMETHAMINE 30 MG/ML
30 INJECTION, SOLUTION INTRAMUSCULAR; INTRAVENOUS ONCE
Status: COMPLETED | OUTPATIENT
Start: 2020-10-26 | End: 2020-10-26

## 2020-10-26 RX ORDER — LIDOCAINE 50 MG/G
1 PATCH TOPICAL ONCE
Status: DISCONTINUED | OUTPATIENT
Start: 2020-10-26 | End: 2020-10-26 | Stop reason: HOSPADM

## 2020-10-26 RX ORDER — DIAZEPAM 5 MG/ML
5 INJECTION, SOLUTION INTRAMUSCULAR; INTRAVENOUS ONCE
Status: COMPLETED | OUTPATIENT
Start: 2020-10-26 | End: 2020-10-26

## 2020-10-26 RX ORDER — LIDOCAINE 50 MG/G
1 PATCH TOPICAL DAILY
Qty: 30 PATCH | Refills: 0 | Status: SHIPPED | OUTPATIENT
Start: 2020-10-26

## 2020-10-26 RX ORDER — ACETAMINOPHEN 325 MG/1
975 TABLET ORAL ONCE
Status: COMPLETED | OUTPATIENT
Start: 2020-10-26 | End: 2020-10-26

## 2020-10-26 RX ADMIN — DIAZEPAM 5 MG: 10 INJECTION, SOLUTION INTRAMUSCULAR; INTRAVENOUS at 09:25

## 2020-10-26 RX ADMIN — ACETAMINOPHEN 975 MG: 325 TABLET, FILM COATED ORAL at 09:26

## 2020-10-26 RX ADMIN — LIDOCAINE 5% 1 PATCH: 700 PATCH TOPICAL at 09:39

## 2020-10-26 RX ADMIN — KETOROLAC TROMETHAMINE 30 MG: 30 INJECTION, SOLUTION INTRAMUSCULAR at 09:25

## 2021-04-28 ENCOUNTER — TELEPHONE (OUTPATIENT)
Dept: SURGICAL ONCOLOGY | Facility: CLINIC | Age: 41
End: 2021-04-28

## 2021-04-28 NOTE — TELEPHONE ENCOUNTER
New Patient Encounter    New Patient Intake Form   Patient Details:  Chandra Doctor  1980  [id number]    Background Information:  [address] starts by opening a telephone encounter and gathering the following information   Who is calling to schedule? If not self, relationship to patient? SELF   Referring Provider PCP  Kristi Latif   What is the diagnosis? Elevated platelets   Is this diagnosis confirmed? Yes   When was the diagnosis? 4/2021   Is there a confirmed diagnosis from a biopsy/tissue reviewed by pathology? Were outside slides requested? NA   Is patient aware of diagnosis? Yes   Is there a personal history and what kind? No   Is there a family history and what kind? No   Reason for visit? New Diagnosis   Have you had any imaging or labs done? If so: when, where? yes  [facility]   Are records in EPIC? no   If patient has a prior history of breast cancer were old records obtained? NA   Was the patient told to bring a disk? No   Does the patient smoke or Vape? No   If yes, how many packs or cartridges per day? Scheduling Information:   Preferred [place]:  [place]     Are there any dates/time the patient cannot be seen? Miscellaneous:    After completing the above information, please route to Financial Counselor and the appropriate Nurse Navigator for review

## 2021-05-15 ENCOUNTER — IMMUNIZATIONS (OUTPATIENT)
Dept: FAMILY MEDICINE CLINIC | Facility: HOSPITAL | Age: 41
End: 2021-05-15

## 2021-05-15 DIAGNOSIS — Z23 ENCOUNTER FOR IMMUNIZATION: Primary | ICD-10-CM

## 2021-05-15 PROCEDURE — 91300 SARS-COV-2 / COVID-19 MRNA VACCINE (PFIZER-BIONTECH) 30 MCG: CPT

## 2021-05-15 PROCEDURE — 0001A SARS-COV-2 / COVID-19 MRNA VACCINE (PFIZER-BIONTECH) 30 MCG: CPT

## 2021-05-19 ENCOUNTER — CONSULT (OUTPATIENT)
Dept: HEMATOLOGY ONCOLOGY | Facility: CLINIC | Age: 41
End: 2021-05-19
Payer: COMMERCIAL

## 2021-05-19 VITALS
SYSTOLIC BLOOD PRESSURE: 142 MMHG | OXYGEN SATURATION: 98 % | WEIGHT: 270 LBS | BODY MASS INDEX: 42.38 KG/M2 | RESPIRATION RATE: 16 BRPM | HEIGHT: 67 IN | DIASTOLIC BLOOD PRESSURE: 90 MMHG | HEART RATE: 86 BPM

## 2021-05-19 DIAGNOSIS — D75.839 THROMBOCYTOSIS: ICD-10-CM

## 2021-05-19 DIAGNOSIS — D50.9 IRON DEFICIENCY ANEMIA, UNSPECIFIED IRON DEFICIENCY ANEMIA TYPE: Primary | ICD-10-CM

## 2021-05-19 DIAGNOSIS — N93.9 ABNORMAL UTERINE BLEEDING: ICD-10-CM

## 2021-05-19 PROCEDURE — 3008F BODY MASS INDEX DOCD: CPT | Performed by: INTERNAL MEDICINE

## 2021-05-19 PROCEDURE — 99244 OFF/OP CNSLTJ NEW/EST MOD 40: CPT | Performed by: INTERNAL MEDICINE

## 2021-05-19 RX ORDER — FERROUS SULFATE 220 (44)/5
220 ELIXIR ORAL
COMMUNITY
Start: 2021-04-28

## 2021-05-19 NOTE — PROGRESS NOTES
HEMATOLOGY / ONCOLOGY CLINIC NOTE    Primary Care Provider: No primary care provider on file  Referring Provider:    MRN: [id number]  : 1980    Reason for Encounter:  Chief Complaint   Patient presents with   Gwen         Hematology / Oncology History:     Artemio Holm is a 45 y o  female who came in for follow up      1, iron deficient anemia  - with PICA  -  likely due to menorrhagia  Received 2 units transfusion the past, after each delivery  Last transfusion 8 years ago  Has been on oral iron supplement for past 6 months  Hemoglobin went up after iron  No GI issues in the past  - no other bleeding    2, menorrhagia  3, thrombocytosis / likely reactive due to iron deficient anemia      Assessment / Plan:       1  Iron deficiency anemia, unspecified iron deficiency anemia type    - most recent CBC in 2021, hemoglobin 11 5  Iron panel showed mild iron deficiency  Patient okay to continue current iron supplement  - patient will check labs in 4 months for follow patient after  I will refer patient to gyn          - CBC and differential; Future  - Iron, TIBC and Ferritin Panel; Future  - [facility] SCREEN; Future  - CBC and differential  - Iron, TIBC and Ferritin Panel  - [facility]  - Ambulatory referral to Gynecology; Future  - Protime-INR; Future  - APTT; Future  - Protime-INR  - APTT    2  Abnormal uterine bleeding     - Ambulatory referral to Gynecology; Future  - Protime-INR; Future  - APTT; Future  - Protime-INR  - APTT    3  Thrombocytosis ([address] )  - as above                         minutes were spent face to face with patient with greater than 50% of the time spent in counseling or coordination of care including discussions of treatment instructions  All of the patient's questions were answered to their satisfactory during this discussion  Advised pt to call if there is any further questions         Interval History:     2021 :  45 year female, history of iron deficiency anemia,  Heavy menstrual bleeding, noticed having elevated platelet, referred to Hematology for further evaluation  Patient came for follow-up, subjectively doing okay  Reported had transfusion the past, last transfusion 8 years ago after   Never had IV iron infusion in the past   Currently taking oral iron  Per patient her hemoglobin has been increasing after taking oral iron  She never have GI issues in the past, no history of colitis, no major GI surgeries in the past   Eating balanced diet  No family history of anemia  Patient has no other hematology or oncology issues in the past    Patient has no bleeding issues  No hematuria  No GI bleeding  Eating balanced diet, nonsmoker do not drink alcohol regular basis      Problem list:     Patient Active Problem List   Diagnosis    Abnormal uterine bleeding (AUB)    Abnormal uterine bleeding    Thrombocytosis (HCC)    Iron deficiency anemia       PHYSICIAL EXAMINATION:       Vital Signs:   /90 (BP Location: Left arm, Patient Position: Sitting, Cuff Size: Adult)   Pulse 86   Resp 16   Ht 5' 7" (1 702 m)   Wt 122 kg (270 lb)   SpO2 98%   BMI 42 29 kg/m²   Body surface area is 2 29 meters squared  Ht Readings from Last 3 Encounters:   21 5' 7" (1 702 m)   10/26/20 5' 7" (1 702 m)   20 5' 7" (1 702 m)       Wt Readings from Last 3 Encounters:   21 122 kg (270 lb)   10/26/20 109 kg (240 lb)   20 111 kg (245 lb)        Temp Readings from Last 3 Encounters:   20 98 3 °F (36 8 °C)   20 98 7 °F (37 1 °C) (Oral)   20 98 °F (36 7 °C) (Oral)        BP Readings from Last 3 Encounters:   21 142/90   10/26/20 126/67   20 112/74         Pulse Readings from Last 3 Encounters:   21 86   10/26/20 78   20 104         Appears comfortable, no major finding       GEN: Alert, awake oriented x3, in no acute distress  HEENT- No pallor, icterus, cyanosis, no oral mucosal lesions,   LAD - no palpable cervical, clavicle, axillary, inguinal LAD  Heart- normal S1 S2, regular rate and rhythm, No murmur, rubs  Lungs- decreased breathing sound bilateral    Abdomen- soft, Non tender, bowel sounds present  Extremities- No cyanosis, clubbing, edema  Neuro- No focal neurological deficit           PAST MEDICAL HISTORY:   has a past medical history of CTS (carpal tunnel syndrome) and Hemorrhoids  PAST SURGICAL HISTORY:   has a past surgical history that includes  section; Carpal tunnel release; and Tubal ligation  CURRENT MEDICATIONS:   Current Outpatient Medications   Medication Sig Dispense Refill    acetaminophen (TYLENOL) 325 mg tablet Take 1,000 mg by mouth every 6 (six) hours as needed      ibuprofen (MOTRIN) 600 mg tablet Take by mouth every 6 (six) hours as needed for mild pain      Iron 15 MG/1 5ML SUSP Take by mouth      lidocaine (LIDODERM) 5 % Apply 1 patch topically daily Remove & Discard patch within 12 hours or as directed by MD 30 patch 0    diazepam (VALIUM) 5 mg tablet Take 1 tablet (5 mg total) by mouth every 6 (six) hours as needed for muscle spasms for up to 3 days 12 tablet 0    ferrous sulfate 220 (44 Fe) mg/5 mL solution Take 220 mg by mouth       No current facility-administered medications for this visit  [unfilled]    SOCIAL HISTORY:   reports that she has never smoked  She has never used smokeless tobacco  She reports that she does not drink alcohol or use drugs  FAMILY HISTORY:  family history is not on file  ALLERGIES:  is allergic to other; wound dressing adhesive; and latex  REVIEW OF SYSTEMS:  Please note that a 14-point review of systems was performed to include Constitutional, HEENT, Respiratory, CVS, GI, , Musculoskeletal, Integumentary, Neurologic, Rheumatologic, Endocrinologic, Psychiatric, Lymphatic, and Hematologic/Oncologic systems were reviewed and are negative unless otherwise stated in HPI   Positive and negative findings pertinent to this evaluation are incorporated into the history of present illness  LAB:  Lab Results   Component Value Date    WBC 9 70 03/11/2020    HGB 9 9 (L) 03/11/2020    HCT 35 0 03/11/2020    MCV 77 (L) 03/11/2020     (H) 03/11/2020     Lab Results   Component Value Date    SODIUM 141 01/05/2020    K 3 5 01/05/2020     01/05/2020    CO2 28 01/05/2020    AGAP 8 01/05/2020    BUN 10 01/05/2020    CREATININE 0 61 01/05/2020    GLUC 94 01/05/2020    CALCIUM 8 8 01/05/2020    EGFR 115 01/05/2020       CBC with diff:       Invalid input(s):  RBC, TOTALCELLSCOUNTED, SEGS%, GRANS%, LYMPHS%, EOS%, BASO%, ABNEUT, ABGRANS, ABLYMPHS, ABMOMOS, ABEOS, ABBASO    CMP:      Invalid input(s): ALBUMIN        IMAGING:  No orders to display     No results found

## 2021-06-12 ENCOUNTER — IMMUNIZATIONS (OUTPATIENT)
Dept: FAMILY MEDICINE CLINIC | Facility: HOSPITAL | Age: 41
End: 2021-06-12

## 2021-06-12 DIAGNOSIS — Z23 ENCOUNTER FOR IMMUNIZATION: Primary | ICD-10-CM

## 2021-06-12 PROCEDURE — 0002A SARS-COV-2 / COVID-19 MRNA VACCINE (PFIZER-BIONTECH) 30 MCG: CPT

## 2021-06-12 PROCEDURE — 91300 SARS-COV-2 / COVID-19 MRNA VACCINE (PFIZER-BIONTECH) 30 MCG: CPT

## 2021-09-14 ENCOUNTER — TELEPHONE (OUTPATIENT)
Dept: HEMATOLOGY ONCOLOGY | Facility: CLINIC | Age: 41
End: 2021-09-14

## 2021-09-14 NOTE — TELEPHONE ENCOUNTER
[facility] for patient asking for call back  to obtain name of lab patient had blood work done so we can get results  If labs were not done, this appointment needs to be R/S'd   [facility] call back number [phone] was provided

## 2021-09-29 ENCOUNTER — TELEPHONE (OUTPATIENT)
Dept: OBGYN CLINIC | Facility: CLINIC | Age: 41
End: 2021-09-29
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
## 2023-04-22 ENCOUNTER — HOSPITAL ENCOUNTER (EMERGENCY)
Facility: HOSPITAL | Age: 43
Discharge: HOME | End: 2023-04-22
Attending: EMERGENCY MEDICINE

## 2023-04-22 VITALS
HEART RATE: 73 BPM | RESPIRATION RATE: 19 BRPM | DIASTOLIC BLOOD PRESSURE: 82 MMHG | SYSTOLIC BLOOD PRESSURE: 136 MMHG | OXYGEN SATURATION: 100 % | TEMPERATURE: 97.6 F

## 2023-04-22 DIAGNOSIS — N93.8 DYSFUNCTIONAL UTERINE BLEEDING: Primary | ICD-10-CM

## 2023-04-22 LAB
ANION GAP SERPL CALCULATED.3IONS-SCNC: 8 MMOL/L (ref 4–13)
BASOPHILS # BLD AUTO: 0.06 THOUSANDS/ΜL (ref 0–0.1)
BASOPHILS NFR BLD AUTO: 1 % (ref 0–1)
BUN SERPL-MCNC: 10 MG/DL (ref 5–25)
CALCIUM SERPL-MCNC: 10.5 MG/DL (ref 8.4–10.2)
CHLORIDE SERPL-SCNC: 105 MMOL/L (ref 96–108)
CO2 SERPL-SCNC: 24 MMOL/L (ref 21–32)
CREAT SERPL-MCNC: 0.68 MG/DL (ref 0.6–1.3)
EOSINOPHIL # BLD AUTO: 0.47 THOUSAND/ΜL (ref 0–0.61)
EOSINOPHIL NFR BLD AUTO: 5 % (ref 0–6)
ERYTHROCYTE [DISTWIDTH] IN BLOOD BY AUTOMATED COUNT: 17.5 % (ref 11.6–15.1)
GFR SERPL CREATININE-BSD FRML MDRD: 108 ML/MIN/1.73SQ M
GLUCOSE SERPL-MCNC: 137 MG/DL (ref 65–140)
HCG SERPL QL: NEGATIVE
HCT VFR BLD AUTO: 35.3 % (ref 34.8–46.1)
HGB BLD-MCNC: 10.8 G/DL (ref 11.5–15.4)
IMM GRANULOCYTES # BLD AUTO: 0.03 THOUSAND/UL (ref 0–0.2)
IMM GRANULOCYTES NFR BLD AUTO: 0 % (ref 0–2)
INR PPP: 0.95 (ref 0.84–1.19)
LYMPHOCYTES # BLD AUTO: 2.87 THOUSANDS/ΜL (ref 0.6–4.47)
LYMPHOCYTES NFR BLD AUTO: 28 % (ref 14–44)
MCH RBC QN AUTO: 27 PG (ref 26.8–34.3)
MCHC RBC AUTO-ENTMCNC: 30.6 G/DL (ref 31.4–37.4)
MCV RBC AUTO: 88 FL (ref 82–98)
MONOCYTES # BLD AUTO: 0.5 THOUSAND/ΜL (ref 0.17–1.22)
MONOCYTES NFR BLD AUTO: 5 % (ref 4–12)
NEUTROPHILS # BLD AUTO: 6.37 THOUSANDS/ΜL (ref 1.85–7.62)
NEUTS SEG NFR BLD AUTO: 61 % (ref 43–75)
NRBC BLD AUTO-RTO: 0 /100 WBCS
PLATELET # BLD AUTO: 434 THOUSANDS/UL (ref 149–390)
PMV BLD AUTO: 9.3 FL (ref 8.9–12.7)
POTASSIUM SERPL-SCNC: 3.7 MMOL/L (ref 3.5–5.3)
PROTHROMBIN TIME: 12.5 SECONDS (ref 11.6–14.5)
RBC # BLD AUTO: 4 MILLION/UL (ref 3.81–5.12)
SODIUM SERPL-SCNC: 137 MMOL/L (ref 135–147)
WBC # BLD AUTO: 10.3 THOUSAND/UL (ref 4.31–10.16)

## 2023-04-22 RX ORDER — TRANEXAMIC ACID 10 MG/ML
1000 INJECTION, SOLUTION INTRAVENOUS ONCE
Status: COMPLETED | OUTPATIENT
Start: 2023-04-22 | End: 2023-04-22

## 2023-04-22 RX ORDER — TRANEXAMIC ACID 650 MG/1
1300 TABLET ORAL 3 TIMES DAILY
Qty: 30 TABLET | Refills: 0 | Status: SHIPPED | OUTPATIENT
Start: 2023-04-22 | End: 2023-04-27

## 2023-04-22 RX ADMIN — TRANEXAMIC ACID 1000 MG: 10 INJECTION, SOLUTION INTRAVENOUS at 20:42

## 2023-04-22 NOTE — ED PROVIDER NOTES
"History  Chief Complaint   Patient presents with   • Vaginal Bleeding     Pt arrives ambulatory with a c/o heavy vaginal bleeding for the last month  Pt states \"I have a problem with bleeding but this is worse than normal\"  Pt reports soaking through 10-12 pads per day  HPI patient is a 81-year-old female she reports irregular menses over the last year  Patient reports over the last several months she has had almost continuous vaginal bleeding  Patient reports she sometimes has 10 or 12 pads that she uses in a given day  Patient reports taking some over-the-counter iron medications because she believes she may be anemic  Patient reports that she knows she should follow-up with a gynecologist but she has no money so she came here to the emergency department  She denies any vaginal injury  She and her significant other reports that they have not had intercourse  She denies pregnancy  She denies trauma  Patient reports she is just concerned because she keeps having vaginal bleeding  Past medical history of carpal tunnel syndrome, hemorrhoids  Family history noncontributory  Social history non-smoker no history of drug abuse    Prior to Admission Medications   Prescriptions Last Dose Informant Patient Reported? Taking?    Iron 15 MG/1 5ML SUSP   Yes No   Sig: Take by mouth   acetaminophen (TYLENOL) 325 mg tablet   Yes No   Sig: Take 1,000 mg by mouth every 6 (six) hours as needed   diazepam (VALIUM) 5 mg tablet   No No   Sig: Take 1 tablet (5 mg total) by mouth every 6 (six) hours as needed for muscle spasms for up to 3 days   ferrous sulfate 220 (44 Fe) mg/5 mL solution   Yes No   Sig: Take 220 mg by mouth   ibuprofen (MOTRIN) 600 mg tablet   Yes No   Sig: Take by mouth every 6 (six) hours as needed for mild pain   lidocaine (LIDODERM) 5 %   No No   Sig: Apply 1 patch topically daily Remove & Discard patch within 12 hours or as directed by MD      Facility-Administered Medications: None       Past Medical " History:   Diagnosis Date   • CTS (carpal tunnel syndrome)    • Hemorrhoids        Past Surgical History:   Procedure Laterality Date   • CARPAL TUNNEL RELEASE     •  SECTION     • TUBAL LIGATION         History reviewed  No pertinent family history  I have reviewed and agree with the history as documented  E-Cigarette/Vaping   • E-Cigarette Use Never User      E-Cigarette/Vaping Substances   • Nicotine No    • THC No    • CBD No    • Flavoring No    • Other No    • Unknown No      Social History     Tobacco Use   • Smoking status: Never   • Smokeless tobacco: Never   Vaping Use   • Vaping Use: Never used   Substance Use Topics   • Alcohol use: No   • Drug use: No       Review of Systems   Constitutional: Negative for diaphoresis, fatigue and fever  HENT: Negative for congestion, ear pain, nosebleeds and sore throat  Eyes: Negative for photophobia, pain, discharge and visual disturbance  Respiratory: Negative for cough, choking, chest tightness, shortness of breath and wheezing  Cardiovascular: Negative for chest pain and palpitations  Gastrointestinal: Negative for abdominal distention, abdominal pain, diarrhea and vomiting  Genitourinary: Positive for vaginal bleeding  Negative for dysuria, flank pain and frequency  Musculoskeletal: Negative for back pain, gait problem and joint swelling  Skin: Negative for color change and rash  Neurological: Negative for dizziness, syncope and headaches  Psychiatric/Behavioral: Negative for behavioral problems and confusion  The patient is not nervous/anxious  All other systems reviewed and are negative  Physical Exam  Physical Exam  Vitals and nursing note reviewed  Constitutional:       Appearance: She is well-developed  HENT:      Head: Normocephalic        Right Ear: External ear normal       Left Ear: External ear normal       Nose: Nose normal    Eyes:      General: Lids are normal       Pupils: Pupils are equal, round, and reactive to light  Cardiovascular:      Rate and Rhythm: Normal rate and regular rhythm  Pulses: Normal pulses  Heart sounds: Normal heart sounds  Pulmonary:      Effort: Pulmonary effort is normal  No respiratory distress  Breath sounds: Normal breath sounds  Abdominal:      General: Abdomen is flat  Bowel sounds are normal       Tenderness: There is no abdominal tenderness  Musculoskeletal:         General: No deformity  Normal range of motion  Cervical back: Normal range of motion and neck supple  Skin:     General: Skin is warm and dry  Neurological:      Mental Status: She is alert and oriented to person, place, and time           Vital Signs  ED Triage Vitals [04/22/23 1909]   Temperature Pulse Respirations Blood Pressure SpO2   97 6 °F (36 4 °C) 73 19 136/82 100 %      Temp Source Heart Rate Source Patient Position - Orthostatic VS BP Location FiO2 (%)   Oral Monitor Sitting Right arm --      Pain Score       --           Vitals:    04/22/23 1909   BP: 136/82   Pulse: 73   Patient Position - Orthostatic VS: Sitting         Visual Acuity      ED Medications  Medications   Tranexamic Acid-NaCl (CYKLOKAPRON) 1000-0 7 MG/100ML-% injection 1,000 mg (0 mg Intravenous Stopped 4/22/23 2100)       Diagnostic Studies  Results Reviewed     Procedure Component Value Units Date/Time    hCG, qualitative pregnancy [[id number]]  (Normal) Collected: 04/22/23 1932    Lab Status: Final result Specimen: Blood from Arm, Right Updated: 04/22/23 2013     Preg, Serum Negative    Protime-INR [[id number]]  (Normal) Collected: 04/22/23 1932    Lab Status: Final result Specimen: Blood from Arm, Right Updated: 04/22/23 2006     Protime 12 5 seconds      INR 4 39    Basic metabolic panel [[id number]]  (Abnormal) Collected: 04/22/23 1932    Lab Status: Final result Specimen: Blood from Arm, Right Updated: 04/22/23 2002     Sodium 137 mmol/L      Potassium 3 7 mmol/L      Chloride 105 mmol/L      CO2 24 mmol/L ANION GAP 8 mmol/L      BUN 10 mg/dL      Creatinine 0 68 mg/dL      Glucose 137 mg/dL      Calcium 10 5 mg/dL      eGFR 108 ml/min/1 73sq m     Narrative:      [place] guidelines for Chronic Kidney Disease (CKD):   •  Stage 1 with normal or high GFR (GFR > 90 mL/min/1 73 square meters)  •  Stage 2 Mild CKD (GFR = 60-89 mL/min/1 73 square meters)  •  Stage 3A Moderate CKD (GFR = 45-59 mL/min/1 73 square meters)  •  Stage 3B Moderate CKD (GFR = 30-44 mL/min/1 73 square meters)  •  Stage 4 Severe CKD (GFR = 15-29 mL/min/1 73 square meters)  •  Stage 5 End Stage CKD (GFR <15 mL/min/1 73 square meters)  Note: GFR calculation is accurate only with a steady state creatinine    CBC and differential [[id number]]  (Abnormal) Collected: 04/22/23 1932    Lab Status: Final result Specimen: Blood from Arm, Right Updated: 04/22/23 1940     WBC 10 30 Thousand/uL      RBC 4 00 Million/uL      Hemoglobin 10 8 g/dL      Hematocrit 35 3 %      MCV 88 fL      MCH 27 0 pg      MCHC 30 6 g/dL      RDW 17 5 %      MPV 9 3 fL      Platelets 268 Thousands/uL      nRBC 0 /100 WBCs      Neutrophils Relative 61 %      Immat GRANS % 0 %      Lymphocytes Relative 28 %      Monocytes Relative 5 %      Eosinophils Relative 5 %      Basophils Relative 1 %      Neutrophils Absolute 6 37 Thousands/µL      Immature Grans Absolute 0 03 Thousand/uL      Lymphocytes Absolute 2 87 Thousands/µL      Monocytes Absolute 0 50 Thousand/µL      Eosinophils Absolute 0 47 Thousand/µL      Basophils Absolute 0 06 Thousands/µL                  No orders to display              Procedures  Procedures         ED Course     Diagnostic testing: Pregnancy test was negative  Coags were normal   Electrolytes were within normal limits  White count was 10 3 nonspecific finding, hemoglobin is 10 9 consistent with chronic anemia      Discussed with the patient we will treat with TXA for dysfunctional uterine bleeding, we discussed gynecology follow-up for further evaluation  No indication for hospitalization there is no acute anemia at this time  SBIRT 22yo+    Flowsheet Row Most Recent Value   Initial Alcohol Screen: US AUDIT-C     1  How often do you have a drink containing alcohol? 0 Filed at: 04/22/2023 1910   2  How many drinks containing alcohol do you have on a typical day you are drinking? 0 Filed at: 04/22/2023 1910   3b  FEMALE Any Age, or MALE 65+: How often do you have 4 or more drinks on one occassion? 0 Filed at: 04/22/2023 1910   Audit-C Score 0 Filed at: 04/22/2023 1910   EUFEMIA: How many times in the past year have you    Used an illegal drug or used a prescription medication for non-medical reasons? Never Filed at: 04/22/2023 [id number] decision making 67-year-old female presents emergency department, history of recurrent heavy periods now with a continuous period for the last month or so  Patient is not anemic  She is not hypotensive  There are no grounds for emergent transfer for gynecology intervention  Discussed with patient will treat with TXA here to try to help her bleeding  We discussed outpatient treatment with oral TXA  We discussed follow-up with gynecology for further evaluation  We discussed indications to return  Dysfunctional uterine bleeding: acute illness or injury  Amount and/or Complexity of Data Reviewed  Labs: ordered  Risk  Prescription drug management            Disposition  Final diagnoses:   Dysfunctional uterine bleeding     Time reflects when diagnosis was documented in both MDM as applicable and the Disposition within this note     Time User Action Codes Description Comment    4/22/2023  8:38 PM Jin Bauer Add [N93 8] Dysfunctional uterine bleeding       ED Disposition     ED Disposition   Discharge    Condition   Stable    Date/Time   Sat Apr 22, 2023  8:38 PM    Comment   Primitivo Hernandez discharge to home/self care                Follow-up Information     Follow up With Specialties Details Why Brook Garrett MD Obstetrics and Gynecology, Obstetrics, Gynecology   [phone] [address] [facility]  [phone]            Discharge Medication List as of 4/22/2023  8:40 PM      START taking these medications    Details   Tranexamic Acid 650 MG TABS Take 2 tablets (1,300 mg total) by mouth 3 (three) times a day for 5 days, Starting Sat 4/22/2023, Until Thu 4/27/2023, Print         CONTINUE these medications which have NOT CHANGED    Details   acetaminophen (TYLENOL) 325 mg tablet Take 1,000 mg by mouth every 6 (six) hours as needed, Historical Med      diazepam (VALIUM) 5 mg tablet Take 1 tablet (5 mg total) by mouth every 6 (six) hours as needed for muscle spasms for up to 3 days, Starting Mon 10/26/2020, Until Thu 10/29/2020, Normal      ferrous sulfate 220 (44 Fe) mg/5 mL solution Take 220 mg by mouth, Starting Wed 4/28/2021, Historical Med      ibuprofen (MOTRIN) 600 mg tablet Take by mouth every 6 (six) hours as needed for mild pain, Historical Med      Iron 15 MG/1 5ML SUSP Take by mouth, Historical Med      lidocaine (LIDODERM) 5 % Apply 1 patch topically daily Remove & Discard patch within 12 hours or as directed by MD, Starting Mon 10/26/2020, Normal             No discharge procedures on file      PDMP Review     None          ED Provider  Electronically Signed by           Mk Cassidy MD  04/22/23 1305

## 2023-04-23 NOTE — DISCHARGE INSTRUCTIONS
Transischemic acid 3 times daily for the next 5 days  Follow-up with gynecology for further evaluation

## 2023-04-25 ENCOUNTER — OFFICE VISIT (OUTPATIENT)
Dept: OBGYN CLINIC | Facility: CLINIC | Age: 43
End: 2023-04-25

## 2023-04-25 ENCOUNTER — APPOINTMENT (OUTPATIENT)
Dept: LAB | Facility: HOSPITAL | Age: 43
End: 2023-04-25

## 2023-04-25 VITALS
HEIGHT: 67 IN | BODY MASS INDEX: 43.32 KG/M2 | WEIGHT: 276 LBS | DIASTOLIC BLOOD PRESSURE: 88 MMHG | SYSTOLIC BLOOD PRESSURE: 116 MMHG

## 2023-04-25 DIAGNOSIS — N93.9 ABNORMAL UTERINE BLEEDING (AUB): ICD-10-CM

## 2023-04-25 DIAGNOSIS — N93.9 ABNORMAL UTERINE BLEEDING (AUB): Primary | ICD-10-CM

## 2023-04-25 LAB — TSH SERPL DL<=0.05 MIU/L-ACNC: 1.48 UIU/ML (ref 0.45–4.5)

## 2023-04-25 RX ORDER — MEDROXYPROGESTERONE ACETATE 10 MG/1
10 TABLET ORAL DAILY
Qty: 10 TABLET | Refills: 0 | Status: SHIPPED | OUTPATIENT
Start: 2023-04-25 | End: 2023-05-05

## 2023-04-25 NOTE — PROGRESS NOTES
Assessment/Plan:    Abnormal uterine bleeding (AUB)  -reviewed possible causes and treatment options for AUB  -plan for pelvic US  Consider EMB pending US results    -advised patient to continue oral iron supplements for iron deficiency anemia  hgb stable in ED  -pt with h/o of AUB previously was considering endometrial ablation  -f/u after imaging to discuss results and treatment options    -call with new or worsening symptoms  Diagnoses and all orders for this visit:    Abnormal uterine bleeding (AUB)  -     US pelvis complete w transvaginal; Future  -     TSH, 3rd generation; Future  -     TSH, 3rd generation  -     medroxyPROGESTERone (PROVERA) 10 mg tablet; Take 1 tablet (10 mg total) by mouth daily for 10 days    BMI 40 0-44 9, adult ([facility] [address] )  -     Ambulatory Referral to Weight Management; Future          Subjective:      Patient ID: Ilona Kawasaki is a 40 y o  female presents for heavy vaginal bleeding x 2 months  States she has a history of heavy menstrual bleeding but this is worse than her normal  She reports vaginal bleeding on most days over the last 2 months often changing pads every hour and passing large clots  She denies abdominal/pelvic pain, cramping, n/v, urinary symptoms or dyspareunia  She has not been sexually active since AUB started  Has tubal for contraception  hcg negative in ED  She reports feeling tired all the time and fatigued easily  History of iron deficiency anemia, stable with iron supplements  hgb stable in the ED  Received TXA IV and orally through ED and states bleeding has slightly improved with medication  She believes her recent weight gain has contributed to her worsening periods and would like to see weight management  Denies any additional concerns  The following portions of the patient's history were reviewed and updated as appropriate:   She  has a past medical history of CTS (carpal tunnel syndrome) and Hemorrhoids    She   Patient Active Problem List Diagnosis Date Noted   • Thrombocytosis 2021   • Iron deficiency anemia 2021   • Abnormal uterine bleeding 2020   • Abnormal uterine bleeding (AUB) 2020     She  has a past surgical history that includes  section; Carpal tunnel release; and Tubal ligation  Her family history is not on file  She  reports that she has never smoked  She has never used smokeless tobacco  She reports that she does not drink alcohol and does not use drugs  Current Outpatient Medications   Medication Sig Dispense Refill   • ferrous sulfate 220 (44 Fe) mg/5 mL solution Take 220 mg by mouth     • ibuprofen (MOTRIN) 600 mg tablet Take by mouth every 6 (six) hours as needed for mild pain     • Iron 15 MG/1 5ML SUSP Take by mouth     • lidocaine (LIDODERM) 5 % Apply 1 patch topically daily Remove & Discard patch within 12 hours or as directed by MD 30 patch 0   • Tranexamic Acid 650 MG TABS Take 2 tablets (1,300 mg total) by mouth 3 (three) times a day for 5 days 30 tablet 0   • acetaminophen (TYLENOL) 325 mg tablet Take 1,000 mg by mouth every 6 (six) hours as needed (Patient not taking: Reported on 2023)     • diazepam (VALIUM) 5 mg tablet Take 1 tablet (5 mg total) by mouth every 6 (six) hours as needed for muscle spasms for up to 3 days (Patient not taking: Reported on 2023) 12 tablet 0     No current facility-administered medications for this visit  She is allergic to other, wound dressing adhesive, and latex       Review of Systems   Constitutional: Positive for fatigue  Negative for chills, diaphoresis and fever  Respiratory: Negative for shortness of breath  Cardiovascular: Negative for chest pain  Gastrointestinal: Negative for abdominal pain, constipation, diarrhea, nausea and vomiting  Genitourinary: Positive for menstrual problem and vaginal bleeding  Negative for dyspareunia, dysuria, frequency, pelvic pain, urgency, vaginal discharge and vaginal pain     Musculoskeletal: "Negative for back pain and myalgias  Skin: Negative for pallor and rash  Neurological: Negative for dizziness, light-headedness and headaches  Hematological: Negative for adenopathy  Does not bruise/bleed easily  Psychiatric/Behavioral: Negative for dysphoric mood  Objective:      /88 (BP Location: Left arm, Patient Position: Sitting, Cuff Size: Large)   Ht 5' 7\" (1 702 m)   Wt 125 kg (276 lb)   BMI 43 23 kg/m²          Physical Exam  Vitals and nursing note reviewed  Constitutional:       General: She is not in acute distress  Appearance: Normal appearance  She is not ill-appearing  HENT:      Head: Normocephalic and atraumatic  Eyes:      Conjunctiva/sclera: Conjunctivae normal    Pulmonary:      Effort: Pulmonary effort is normal    Abdominal:      Palpations: Abdomen is soft  Tenderness: There is no abdominal tenderness  Genitourinary:     Comments: Pelvic exam deferred due to heavy menses  Musculoskeletal:         General: Normal range of motion  Cervical back: Neck supple  Skin:     General: Skin is warm and dry  Neurological:      General: No focal deficit present  Mental Status: She is alert     Psychiatric:         Mood and Affect: Mood normal          Behavior: Behavior normal          "

## 2023-04-25 NOTE — PATIENT INSTRUCTIONS
Abnormal (Dysfunctional) Uterine Bleeding   WHAT YOU NEED TO KNOW:   Abnormal uterine bleeding (AUB) is uterine bleeding that is not usual for you  It may also be called dysfunctional uterine bleeding  You may have bleeding from your uterus at times other than your normal monthly period  Your monthly periods may last longer or shorter, and bleeding may be heavier or lighter than usual  AUB can be acute (lasting a short time) or chronic (lasting longer than 6 months)  DISCHARGE INSTRUCTIONS:   Return to the emergency department if:   You continue to bleed heavily, or you feel faint  Call your doctor or gynecologist if:   You need to change your sanitary pad or tampon more than 1 time each hour  Your medicine causes nausea, vomiting, or diarrhea  You have questions or concerns about your condition or care  Medicines: You may need any of the following:  Hormones  help decrease bleeding by making your monthly periods more regular  Sometimes this medicine may be given as birth control pills  Iron supplements  may be given if your blood iron level decreases because of heavy bleeding  Iron may make you constipated  Ask your healthcare provider for ways to prevent or treat constipation  Iron may also make your bowel movements turn dark or black  Take your medicine as directed  Contact your healthcare provider if you think your medicine is not helping or if you have side effects  Tell your provider if you are allergic to any medicine  Keep a list of the medicines, vitamins, and herbs you take  Include the amounts, and when and why you take them  Bring the list or the pill bottles to follow-up visits  Carry your medicine list with you in case of an emergency  Self-care:   Apply heat on your lower abdomen to decrease pain and muscle spasms  Apply heat for 20 to 30 minutes every 2 hours for as many days as directed  Include foods high in iron if needed    Examples of foods high in iron are leafy green vegetables, beef, pork, liver, eggs, and whole-grain breads and cereals  Keep a diary of your menstrual cycles  Keep track of the number of tampons or pads you use each day  Talk to your healthcare provider before you start a weight loss program   You may need to wait until the abnormal bleeding has stopped before you try to lose weight  The amount of iron in your blood should be normal before you lose weight  Ask your provider if weight loss will help your AUB  He or she can tell you what weight is healthy for you  He or she can help you create a safe weight loss plan, if needed  Follow up with your doctor or gynecologist as directed: You may need to return in 4 to 6 months so your provider knows if the AUB has stopped  Bring the diary of your menstrual cycles to your follow-up visits  Write down your questions so you remember to ask them during your visits  © [employer] 2022 Information is for End User's use only and may not be sold, redistributed or otherwise used for commercial purposes  The above information is an  only  It is not intended as medical advice for individual conditions or treatments  Talk to your doctor, nurse or pharmacist before following any medical regimen to see if it is safe and effective for you

## 2023-04-25 NOTE — PROGRESS NOTES
"C/o Heavy bleeding with \"big clots\" for almost 2 months  Pt states she has low hemoglobin    BC: Tubal   "

## 2023-04-25 NOTE — ASSESSMENT & PLAN NOTE
-reviewed possible causes and treatment options for AUB  -plan for pelvic US  Consider EMB pending US results    -advised patient to continue oral iron supplements for iron deficiency anemia  hgb stable in ED  -pt with h/o of AUB previously was considering endometrial ablation  -f/u after imaging to discuss results and treatment options    -call with new or worsening symptoms

## 2023-05-11 ENCOUNTER — HOSPITAL ENCOUNTER (OUTPATIENT)
Dept: ULTRASOUND IMAGING | Facility: HOSPITAL | Age: 43
Discharge: HOME | End: 2023-05-11

## 2023-05-11 DIAGNOSIS — N93.9 ABNORMAL UTERINE BLEEDING (AUB): ICD-10-CM

## 2023-05-15 ENCOUNTER — TELEPHONE (OUTPATIENT)
Dept: OBGYN CLINIC | Facility: CLINIC | Age: 43
End: 2023-05-15

## 2023-05-15 NOTE — TELEPHONE ENCOUNTER
Pt is calling for results of pelvic ultrasound - she has started with bleeding again after having used some medicine  Please advise

## 2023-05-17 ENCOUNTER — TELEPHONE (OUTPATIENT)
Dept: OBGYN CLINIC | Facility: CLINIC | Age: 43
End: 2023-05-17

## 2023-05-17 NOTE — TELEPHONE ENCOUNTER
----- Message from Al Huffman sent at 5/17/2023  8:40 AM EDT -----  Regarding: results  Contact: [phone]  R my results available for today

## 2023-05-23 ENCOUNTER — OFFICE VISIT (OUTPATIENT)
Dept: OBGYN CLINIC | Facility: CLINIC | Age: 43
End: 2023-05-23

## 2023-05-23 VITALS
BODY MASS INDEX: 43.32 KG/M2 | WEIGHT: 276 LBS | SYSTOLIC BLOOD PRESSURE: 116 MMHG | DIASTOLIC BLOOD PRESSURE: 86 MMHG | HEIGHT: 67 IN

## 2023-05-23 DIAGNOSIS — R93.89 ENDOMETRIAL THICKENING ON ULTRASOUND: Primary | ICD-10-CM

## 2023-05-23 DIAGNOSIS — N83.201 OVARIAN CYST, RIGHT: ICD-10-CM

## 2023-05-23 DIAGNOSIS — N93.9 ABNORMAL UTERINE BLEEDING (AUB): ICD-10-CM

## 2023-05-23 NOTE — ASSESSMENT & PLAN NOTE
-septated right ovarian cyst 4 8 cm ORADS 2 on TVUS  -repeat ultrasound in 8-12 weeks   -plan for possible cystectomy with D&C pending results of repeat ultrasound  -ER precautions reviewed

## 2023-05-23 NOTE — PROGRESS NOTES
Assessment/Plan:    Ovarian cyst, right  -septated right ovarian cyst 4 8 cm ORADS 2 on TVUS  -repeat ultrasound in 8-12 weeks   -plan for possible cystectomy with D&C pending results of repeat ultrasound  -ER precautions reviewed  Abnormal uterine bleeding (AUB)  -Lysteda as needed for heavy or abnormal bleeding   -reviewed management options including IUD, lysteda, provera, and ablation  Patient is interested in ablation  Endometrial thickening on ultrasound  -unsuccessful EMB attempt in office today due to pain    -plan for [facility]  consult with MD  Pending repeat ultrasound results for large ovarian cyst  Consider ablation vs IUD under anesthesia  Diagnoses and all orders for this visit:    Endometrial thickening on ultrasound    Ovarian cyst, right  -     US pelvis complete w transvaginal; Future    Abnormal uterine bleeding (AUB)          Subjective:      Patient ID: Piper Garvey is a 40 y o  female here for follow up visit for abnormal uterine bleeding  Initially was having heavy menstrual bleeding for 2 months  Bleeding improved with TXA  She was prescribed provera also but has not needed to take it  She had one episode of bleeding starting on 5/13 that lasted for 8 days - 5 of which were heavy  She states this was more consistent with her normal periods  Denies cramping  I reviewed the pelvic ultrasound results with the patient  Ultrasound showed an enlarged uterus measuring 14 cm with an endometrial lining of 20 mm  It also showed multiple ovarian cyst- 3 simple cyst bilaterally and a septated cyst in the right ovary measuring 4 8 x 3 4 x 4 cm  The following portions of the patient's history were reviewed and updated as appropriate:   She  has a past medical history of CTS (carpal tunnel syndrome) and Hemorrhoids    She   Patient Active Problem List    Diagnosis Date Noted   • Endometrial thickening on ultrasound 05/23/2023   • Ovarian cyst, right 05/23/2023   • Abnormal uterine bleeding (AUB) 2023   • Thrombocytosis 2021   • Iron deficiency anemia 2021     She  has a past surgical history that includes  section; Carpal tunnel release; and Tubal ligation  Her family history is not on file  She  reports that she has never smoked  She has never used smokeless tobacco  She reports that she does not drink alcohol and does not use drugs  Current Outpatient Medications   Medication Sig Dispense Refill   • ferrous sulfate 220 (44 Fe) mg/5 mL solution Take 220 mg by mouth     • ibuprofen (MOTRIN) 600 mg tablet Take by mouth every 6 (six) hours as needed for mild pain     • Iron 15 MG/1 5ML SUSP Take by mouth     • lidocaine (LIDODERM) 5 % Apply 1 patch topically daily Remove & Discard patch within 12 hours or as directed by MD 30 patch 0   • acetaminophen (TYLENOL) 325 mg tablet Take 1,000 mg by mouth every 6 (six) hours as needed (Patient not taking: Reported on 2023)     • diazepam (VALIUM) 5 mg tablet Take 1 tablet (5 mg total) by mouth every 6 (six) hours as needed for muscle spasms for up to 3 days (Patient not taking: Reported on 2023) 12 tablet 0   • medroxyPROGESTERone (PROVERA) 10 mg tablet Take 1 tablet (10 mg total) by mouth daily for 10 days 10 tablet 0     No current facility-administered medications for this visit  She is allergic to other, wound dressing adhesive, and latex       Review of Systems   Constitutional: Negative for chills, fatigue and fever  Respiratory: Negative for shortness of breath  Gastrointestinal: Negative for abdominal pain  Genitourinary: Positive for menstrual problem  Negative for pelvic pain, vaginal bleeding, vaginal discharge and vaginal pain  Musculoskeletal: Negative for back pain  Skin: Negative for rash  Neurological: Negative for light-headedness and headaches  Hematological: Negative for adenopathy  Psychiatric/Behavioral: Negative for confusion           Objective:      /86 (BP "Location: Left arm, Patient Position: Sitting, Cuff Size: Large)   Ht 5' 7\" (1 702 m)   Wt 125 kg (276 lb)   BMI 43 23 kg/m²          Physical Exam  Vitals and nursing note reviewed  Constitutional:       General: She is not in acute distress  Appearance: Normal appearance  She is not ill-appearing  HENT:      Head: Normocephalic and atraumatic  Eyes:      Conjunctiva/sclera: Conjunctivae normal    Pulmonary:      Effort: Pulmonary effort is normal    Abdominal:      Palpations: Abdomen is soft  Tenderness: There is no abdominal tenderness  Genitourinary:     General: Normal vulva  Exam position: Lithotomy position  Labia:         Right: No rash, tenderness, lesion or injury  Left: No rash, tenderness, lesion or injury  Vagina: No signs of injury and foreign body  No vaginal discharge, erythema, tenderness, bleeding, lesions or prolapsed vaginal walls  Cervix: No cervical motion tenderness, discharge, friability, lesion, erythema, cervical bleeding or eversion  Musculoskeletal:         General: Normal range of motion  Cervical back: Neck supple  Lymphadenopathy:      Lower Body: No right inguinal adenopathy  No left inguinal adenopathy  Skin:     General: Skin is warm and dry  Neurological:      General: No focal deficit present  Mental Status: She is alert  Psychiatric:         Mood and Affect: Mood normal          Behavior: Behavior normal        I have spent a total time of 40 minutes on 05/23/23 in caring for this patient including Diagnostic results, Reviewing / ordering tests, medicine, procedures   and Communicating with other healthcare professionals       "

## 2023-05-23 NOTE — ASSESSMENT & PLAN NOTE
-Lysteda as needed for heavy or abnormal bleeding   -reviewed management options including IUD, lysteda, provera, and ablation  Patient is interested in ablation

## 2023-05-23 NOTE — PATIENT INSTRUCTIONS
Abnormal (Dysfunctional) Uterine Bleeding   WHAT YOU NEED TO KNOW:   Abnormal uterine bleeding (AUB) is uterine bleeding that is not usual for you  It may also be called dysfunctional uterine bleeding  You may have bleeding from your uterus at times other than your normal monthly period  Your monthly periods may last longer or shorter, and bleeding may be heavier or lighter than usual  AUB can be acute (lasting a short time) or chronic (lasting longer than 6 months)  DISCHARGE INSTRUCTIONS:   Return to the emergency department if:   You continue to bleed heavily, or you feel faint  Call your doctor or gynecologist if:   You need to change your sanitary pad or tampon more than 1 time each hour  Your medicine causes nausea, vomiting, or diarrhea  You have questions or concerns about your condition or care  Medicines: You may need any of the following:  Hormones  help decrease bleeding by making your monthly periods more regular  Sometimes this medicine may be given as birth control pills  Iron supplements  may be given if your blood iron level decreases because of heavy bleeding  Iron may make you constipated  Ask your healthcare provider for ways to prevent or treat constipation  Iron may also make your bowel movements turn dark or black  Take your medicine as directed  Contact your healthcare provider if you think your medicine is not helping or if you have side effects  Tell your provider if you are allergic to any medicine  Keep a list of the medicines, vitamins, and herbs you take  Include the amounts, and when and why you take them  Bring the list or the pill bottles to follow-up visits  Carry your medicine list with you in case of an emergency  Self-care:   Apply heat on your lower abdomen to decrease pain and muscle spasms  Apply heat for 20 to 30 minutes every 2 hours for as many days as directed  Include foods high in iron if needed    Examples of foods high in iron are leafy green vegetables, beef, pork, liver, eggs, and whole-grain breads and cereals  Keep a diary of your menstrual cycles  Keep track of the number of tampons or pads you use each day  Talk to your healthcare provider before you start a weight loss program   You may need to wait until the abnormal bleeding has stopped before you try to lose weight  The amount of iron in your blood should be normal before you lose weight  Ask your provider if weight loss will help your AUB  He or she can tell you what weight is healthy for you  He or she can help you create a safe weight loss plan, if needed  Follow up with your doctor or gynecologist as directed: You may need to return in 4 to 6 months so your provider knows if the AUB has stopped  Bring the diary of your menstrual cycles to your follow-up visits  Write down your questions so you remember to ask them during your visits  © Copyright [employer] 2022 Information is for End User's use only and may not be sold, redistributed or otherwise used for commercial purposes  The above information is an  only  It is not intended as medical advice for individual conditions or treatments  Talk to your doctor, nurse or pharmacist before following any medical regimen to see if it is safe and effective for you

## 2023-05-23 NOTE — PROGRESS NOTES
"Endometrial biopsy    Date/Time: 5/23/2023 3:40 PM  Performed by: MIGUEL ANGEL Tavarez  Authorized by: MIGUEL ANGEL Tavarez   Universal Protocol:  Consent: Verbal consent obtained  Risks and benefits: risks, benefits and alternatives were discussed  Consent given by: patient  Time out: Immediately prior to procedure a \"time out\" was called to verify the correct patient, procedure, equipment, support staff and site/side marked as required  Timeout called at: 5/23/2023 3:40 PM   Patient understanding: patient states understanding of the procedure being performed  Patient consent: the patient's understanding of the procedure matches consent given  Procedure consent: procedure consent matches procedure scheduled  Relevant documents: relevant documents present and verified  Radiology Images displayed and confirmed  If images not available, report reviewed: imaging studies available  Required items: required blood products, implants, devices, and special equipment available  Patient identity confirmed: verbally with patient      Indication:     Indications: Other disorder of menstruation and other abnormal bleeding from female genital tract    Procedure:     Procedure: endometrial biopsy with Pipelle      A bivalve speculum was placed in the vagina: yes      Cervix cleaned and prepped: yes      Unable to perform due to: pain    Findings:     Cervix: normal    Comments:     Procedure comments:  Cervix visualized on speculum exam and cleansed with betadine/iodine  Tenaculum placed and pressure applied to external cervical os with pipelle  Patient at that time requested procedure to be terminated due to pain and discomfort  Reviewed alternative options with patient including D&C under anesthesia with possible ablation vs IUD in OR  Will schedule pre-op consult with MD to discuss after results of repeat ultrasound are available           "

## 2023-05-23 NOTE — ASSESSMENT & PLAN NOTE
-unsuccessful EMB attempt in office today due to pain    -plan for [facility]  consult with MD  Pending repeat ultrasound results for large ovarian cyst  Consider ablation vs IUD under anesthesia

## 2023-05-30 ENCOUNTER — OFFICE VISIT (OUTPATIENT)
Dept: OBGYN CLINIC | Facility: CLINIC | Age: 43
End: 2023-05-30

## 2023-05-30 VITALS
HEIGHT: 67 IN | SYSTOLIC BLOOD PRESSURE: 124 MMHG | WEIGHT: 277.8 LBS | DIASTOLIC BLOOD PRESSURE: 82 MMHG | BODY MASS INDEX: 43.6 KG/M2

## 2023-05-30 DIAGNOSIS — R93.89 ENDOMETRIAL THICKENING ON ULTRASOUND: ICD-10-CM

## 2023-05-30 DIAGNOSIS — N83.201 OVARIAN CYST, RIGHT: ICD-10-CM

## 2023-05-30 DIAGNOSIS — N92.0 MENORRHAGIA WITH REGULAR CYCLE: Primary | ICD-10-CM

## 2023-05-30 NOTE — H&P (VIEW-ONLY)
Assessment/Plan:    Ovarian cyst, right  Follow up imaging has been ordered  If cyst is persistent can consider Laparoscopic Right ovarian cyst removal    Endometrial thickening on ultrasound  Sampling attempted in the office, unable to be completed  Plan for [facility]  Hysteroscopy with possible polypectomy    Menorrhagia with regular cycle  Options reviewed  Medicaitons: hormonal, Lysteda, IUd  Surgery : D&C Hysteroscopy with Novasure ablation    Plan for [facility]  Hysteroscopy with possible polypectomy with Novasure ablation    We reviewed the risks of the surgery including but not limited to infection, bleeding, injury to nearby organs (bowel  bladder, ureter, blood vessel, nerve) and possible long term consequences of such injury, as well as possibility of need for blood transfusion and other resuscitative measures  Diagnoses and all orders for this visit:    Menorrhagia with regular cycle    Ovarian cyst, right    Endometrial thickening on ultrasound          Subjective:      Patient ID: Agata Carbajal is a 40 y o  female  Patient here to discuss surgical options for her ovarian cyst, AUB and Endometrial thickening  EMB attempt 5/23 was unsuccessful  Interested in [facility]  procedure    43yo G4P 3 0 1 3 with heavy regular menses  Imaging shows thickened endometrium  In office biopsy and IUD placement failed  Here to discuss management options  Menses happens monthly  Lasting a week  Uses overnight pads and changes them frequently  Feeling fatigue, having headaches  Does not want to be on hormonal treatments  EB in office unsuccesful by colleague to do stenosis and patient pain  Gynecologic Exam  She complains of vaginal bleeding  She reports no genital itching, genital lesions, genital odor, genital rash, pelvic pain or vaginal discharge  This is a new problem  The current episode started more than 1 year ago  The problem occurs intermittently   The problem has been gradually worsening since onset  The patient is experiencing no pain  Associated symptoms include headaches  Pertinent negatives include no abdominal pain, back pain, chills, constipation, diarrhea, dysuria, fever, flank pain, frequency, nausea, painful intercourse, rash, sore throat, urgency or vomiting  The vaginal bleeding is heavier than menses  Patient has been passing clots  Patient has not been passing tissue  The symptoms are aggravated by activity and heavy lifting  Past treatments include heating pads and NSAIDs  The treatment provided no relief  She is sexually active  The patient's menstrual history has been regular  The following portions of the patient's history were reviewed and updated as appropriate:   She  has a past medical history of CTS (carpal tunnel syndrome) and Hemorrhoids  She   Patient Active Problem List    Diagnosis Date Noted   • Endometrial thickening on ultrasound 2023   • Ovarian cyst, right 2023   • Menorrhagia with regular cycle 2023   • Thrombocytosis 2021   • Iron deficiency anemia 2021     She  has a past surgical history that includes  section; Carpal tunnel release; and Tubal ligation  Her family history is not on file  She  reports that she has never smoked  She has never used smokeless tobacco  She reports that she does not drink alcohol and does not use drugs    Current Outpatient Medications   Medication Sig Dispense Refill   • ferrous sulfate 220 (44 Fe) mg/5 mL solution Take 220 mg by mouth     • ibuprofen (MOTRIN) 600 mg tablet Take by mouth every 6 (six) hours as needed for mild pain     • Iron 15 MG/1 5ML SUSP Take by mouth     • lidocaine (LIDODERM) 5 % Apply 1 patch topically daily Remove & Discard patch within 12 hours or as directed by MD 30 patch 0   • acetaminophen (TYLENOL) 325 mg tablet Take 1,000 mg by mouth every 6 (six) hours as needed (Patient not taking: Reported on 2023)     • diazepam (VALIUM) 5 mg tablet Take 1 tablet (5 mg total) by mouth every 6 (six) hours as needed for muscle spasms for up to 3 days (Patient not taking: Reported on 4/25/2023) 12 tablet 0   • medroxyPROGESTERone (PROVERA) 10 mg tablet Take 1 tablet (10 mg total) by mouth daily for 10 days (Patient not taking: Reported on 5/30/2023) 10 tablet 0     No current facility-administered medications for this visit  Current Outpatient Medications on File Prior to Visit   Medication Sig   • ferrous sulfate 220 (44 Fe) mg/5 mL solution Take 220 mg by mouth   • ibuprofen (MOTRIN) 600 mg tablet Take by mouth every 6 (six) hours as needed for mild pain   • Iron 15 MG/1 5ML SUSP Take by mouth   • lidocaine (LIDODERM) 5 % Apply 1 patch topically daily Remove & Discard patch within 12 hours or as directed by MD   • acetaminophen (TYLENOL) 325 mg tablet Take 1,000 mg by mouth every 6 (six) hours as needed (Patient not taking: Reported on 4/25/2023)   • diazepam (VALIUM) 5 mg tablet Take 1 tablet (5 mg total) by mouth every 6 (six) hours as needed for muscle spasms for up to 3 days (Patient not taking: Reported on 4/25/2023)   • medroxyPROGESTERone (PROVERA) 10 mg tablet Take 1 tablet (10 mg total) by mouth daily for 10 days (Patient not taking: Reported on 5/30/2023)     No current facility-administered medications on file prior to visit  She is allergic to other, wound dressing adhesive, and latex       Review of Systems   Constitutional: Negative for activity change, appetite change, chills, fatigue and fever  HENT: Negative for rhinorrhea, sneezing and sore throat  Eyes: Negative for visual disturbance  Respiratory: Negative for cough, shortness of breath and wheezing  Cardiovascular: Negative for chest pain, palpitations and leg swelling  Gastrointestinal: Negative for abdominal distention, abdominal pain, constipation, diarrhea, nausea and vomiting     Genitourinary: Negative for difficulty urinating, dysuria, flank pain, frequency, pelvic pain, urgency and "vaginal discharge  Musculoskeletal: Negative for back pain  Skin: Negative for rash  Neurological: Positive for headaches  Negative for syncope and light-headedness  Objective:      /82 (BP Location: Left arm, Patient Position: Sitting, Cuff Size: Standard)   Ht 5' 7\" (1 702 m)   Wt 126 kg (277 lb 12 8 oz)   LMP 05/13/2023 (Exact Date)   BMI 43 51 kg/m²          Physical Exam  Constitutional:       General: She is not in acute distress  Appearance: She is well-developed  She is not diaphoretic  Neck:      Vascular: No JVD  Cardiovascular:      Rate and Rhythm: Normal rate and regular rhythm  Heart sounds: Normal heart sounds  No murmur heard  No friction rub  No gallop  Pulmonary:      Effort: Pulmonary effort is normal  No respiratory distress  Breath sounds: Normal breath sounds  Abdominal:      General: Bowel sounds are normal  There is no distension  Palpations: Abdomen is soft  Tenderness: There is no abdominal tenderness  There is no guarding or rebound  Genitourinary:     Labia:         Right: No rash, tenderness or lesion  Left: No rash, tenderness or lesion  Vagina: Normal  No vaginal discharge, erythema or tenderness  Cervix: No cervical motion tenderness, discharge or friability  Uterus: Not deviated, not enlarged, not fixed and not tender  Adnexa:         Right: No mass, tenderness or fullness  Left: No mass, tenderness or fullness  Musculoskeletal:      Cervical back: Neck supple  Right lower leg: No edema  Left lower leg: No edema  Lymphadenopathy:      Cervical: No cervical adenopathy  Neurological:      Mental Status: She is alert and oriented to person, place, and time  Deep Tendon Reflexes: Reflexes are normal and symmetric           "

## 2023-05-30 NOTE — PROGRESS NOTES
Assessment/Plan:    Ovarian cyst, right  Follow up imaging has been ordered  If cyst is persistent can consider Laparoscopic Right ovarian cyst removal    Endometrial thickening on ultrasound  Sampling attempted in the office, unable to be completed  Plan for [facility]  Hysteroscopy with possible polypectomy    Menorrhagia with regular cycle  Options reviewed  Medicaitons: hormonal, Lysteda, IUd  Surgery : D&C Hysteroscopy with Novasure ablation    Plan for [facility]  Hysteroscopy with possible polypectomy with Novasure ablation    We reviewed the risks of the surgery including but not limited to infection, bleeding, injury to nearby organs (bowel  bladder, ureter, blood vessel, nerve) and possible long term consequences of such injury, as well as possibility of need for blood transfusion and other resuscitative measures  Diagnoses and all orders for this visit:    Menorrhagia with regular cycle    Ovarian cyst, right    Endometrial thickening on ultrasound          Subjective:      Patient ID: Florentino Brown is a 40 y o  female  Patient here to discuss surgical options for her ovarian cyst, AUB and Endometrial thickening  EMB attempt 5/23 was unsuccessful  Interested in [facility]  procedure    41yo G4P 3 0 1 3 with heavy regular menses  Imaging shows thickened endometrium  In office biopsy and IUD placement failed  Here to discuss management options  Menses happens monthly  Lasting a week  Uses overnight pads and changes them frequently  Feeling fatigue, having headaches  Does not want to be on hormonal treatments  EB in office unsuccesful by colleague to do stenosis and patient pain  Gynecologic Exam  She complains of vaginal bleeding  She reports no genital itching, genital lesions, genital odor, genital rash, pelvic pain or vaginal discharge  This is a new problem  The current episode started more than 1 year ago  The problem occurs intermittently   The problem has been gradually worsening since onset  The patient is experiencing no pain  Associated symptoms include headaches  Pertinent negatives include no abdominal pain, back pain, chills, constipation, diarrhea, dysuria, fever, flank pain, frequency, nausea, painful intercourse, rash, sore throat, urgency or vomiting  The vaginal bleeding is heavier than menses  Patient has been passing clots  Patient has not been passing tissue  The symptoms are aggravated by activity and heavy lifting  Past treatments include heating pads and NSAIDs  The treatment provided no relief  She is sexually active  The patient's menstrual history has been regular  The following portions of the patient's history were reviewed and updated as appropriate:   She  has a past medical history of CTS (carpal tunnel syndrome) and Hemorrhoids  She   Patient Active Problem List    Diagnosis Date Noted   • Endometrial thickening on ultrasound 2023   • Ovarian cyst, right 2023   • Menorrhagia with regular cycle 2023   • Thrombocytosis 2021   • Iron deficiency anemia 2021     She  has a past surgical history that includes  section; Carpal tunnel release; and Tubal ligation  Her family history is not on file  She  reports that she has never smoked  She has never used smokeless tobacco  She reports that she does not drink alcohol and does not use drugs    Current Outpatient Medications   Medication Sig Dispense Refill   • ferrous sulfate 220 (44 Fe) mg/5 mL solution Take 220 mg by mouth     • ibuprofen (MOTRIN) 600 mg tablet Take by mouth every 6 (six) hours as needed for mild pain     • Iron 15 MG/1 5ML SUSP Take by mouth     • lidocaine (LIDODERM) 5 % Apply 1 patch topically daily Remove & Discard patch within 12 hours or as directed by MD 30 patch 0   • acetaminophen (TYLENOL) 325 mg tablet Take 1,000 mg by mouth every 6 (six) hours as needed (Patient not taking: Reported on 2023)     • diazepam (VALIUM) 5 mg tablet Take 1 tablet (5 mg total) by mouth every 6 (six) hours as needed for muscle spasms for up to 3 days (Patient not taking: Reported on 4/25/2023) 12 tablet 0   • medroxyPROGESTERone (PROVERA) 10 mg tablet Take 1 tablet (10 mg total) by mouth daily for 10 days (Patient not taking: Reported on 5/30/2023) 10 tablet 0     No current facility-administered medications for this visit  Current Outpatient Medications on File Prior to Visit   Medication Sig   • ferrous sulfate 220 (44 Fe) mg/5 mL solution Take 220 mg by mouth   • ibuprofen (MOTRIN) 600 mg tablet Take by mouth every 6 (six) hours as needed for mild pain   • Iron 15 MG/1 5ML SUSP Take by mouth   • lidocaine (LIDODERM) 5 % Apply 1 patch topically daily Remove & Discard patch within 12 hours or as directed by MD   • acetaminophen (TYLENOL) 325 mg tablet Take 1,000 mg by mouth every 6 (six) hours as needed (Patient not taking: Reported on 4/25/2023)   • diazepam (VALIUM) 5 mg tablet Take 1 tablet (5 mg total) by mouth every 6 (six) hours as needed for muscle spasms for up to 3 days (Patient not taking: Reported on 4/25/2023)   • medroxyPROGESTERone (PROVERA) 10 mg tablet Take 1 tablet (10 mg total) by mouth daily for 10 days (Patient not taking: Reported on 5/30/2023)     No current facility-administered medications on file prior to visit  She is allergic to other, wound dressing adhesive, and latex       Review of Systems   Constitutional: Negative for activity change, appetite change, chills, fatigue and fever  HENT: Negative for rhinorrhea, sneezing and sore throat  Eyes: Negative for visual disturbance  Respiratory: Negative for cough, shortness of breath and wheezing  Cardiovascular: Negative for chest pain, palpitations and leg swelling  Gastrointestinal: Negative for abdominal distention, abdominal pain, constipation, diarrhea, nausea and vomiting     Genitourinary: Negative for difficulty urinating, dysuria, flank pain, frequency, pelvic pain, urgency and "vaginal discharge  Musculoskeletal: Negative for back pain  Skin: Negative for rash  Neurological: Positive for headaches  Negative for syncope and light-headedness  Objective:      /82 (BP Location: Left arm, Patient Position: Sitting, Cuff Size: Standard)   Ht 5' 7\" (1 702 m)   Wt 126 kg (277 lb 12 8 oz)   LMP 05/13/2023 (Exact Date)   BMI 43 51 kg/m²          Physical Exam  Constitutional:       General: She is not in acute distress  Appearance: She is well-developed  She is not diaphoretic  Neck:      Vascular: No JVD  Cardiovascular:      Rate and Rhythm: Normal rate and regular rhythm  Heart sounds: Normal heart sounds  No murmur heard  No friction rub  No gallop  Pulmonary:      Effort: Pulmonary effort is normal  No respiratory distress  Breath sounds: Normal breath sounds  Abdominal:      General: Bowel sounds are normal  There is no distension  Palpations: Abdomen is soft  Tenderness: There is no abdominal tenderness  There is no guarding or rebound  Genitourinary:     Labia:         Right: No rash, tenderness or lesion  Left: No rash, tenderness or lesion  Vagina: Normal  No vaginal discharge, erythema or tenderness  Cervix: No cervical motion tenderness, discharge or friability  Uterus: Not deviated, not enlarged, not fixed and not tender  Adnexa:         Right: No mass, tenderness or fullness  Left: No mass, tenderness or fullness  Musculoskeletal:      Cervical back: Neck supple  Right lower leg: No edema  Left lower leg: No edema  Lymphadenopathy:      Cervical: No cervical adenopathy  Neurological:      Mental Status: She is alert and oriented to person, place, and time  Deep Tendon Reflexes: Reflexes are normal and symmetric           "

## 2023-05-30 NOTE — ASSESSMENT & PLAN NOTE
Follow up imaging has been ordered    If cyst is persistent can consider Laparoscopic Right ovarian cyst removal
Options reviewed  Medicaitons: hormonal, Lysteda, IUd  Surgery : D&C Hysteroscopy with Novasure ablation    Plan for [facility]  Hysteroscopy with possible polypectomy with Novasure ablation    We reviewed the risks of the surgery including but not limited to infection, bleeding, injury to nearby organs (bowel  bladder, ureter, blood vessel, nerve) and possible long term consequences of such injury, as well as possibility of need for blood transfusion and other resuscitative measures 
Sampling attempted in the office, unable to be completed       Plan for [facility]  Hysteroscopy with possible polypectomy
all other ROS negative except as per HPI

## 2023-05-31 ENCOUNTER — TELEPHONE (OUTPATIENT)
Dept: OBGYN CLINIC | Facility: CLINIC | Age: 43
End: 2023-05-31

## 2023-06-01 NOTE — TELEPHONE ENCOUNTER
Talked to patient she is scheduled for her surgical procedure on 6/23/2023 with Dr Deng Mejia in the [address] [place] [address]   Surgical packet mailed out today

## 2023-06-14 ENCOUNTER — APPOINTMENT (OUTPATIENT)
Dept: LAB | Facility: HOSPITAL | Age: 43
End: 2023-06-14
Payer: COMMERCIAL

## 2023-06-14 ENCOUNTER — OFFICE VISIT (OUTPATIENT)
Dept: LAB | Facility: HOSPITAL | Age: 43
End: 2023-06-14
Payer: COMMERCIAL

## 2023-06-14 DIAGNOSIS — Z01.818 PRE-OP TESTING: ICD-10-CM

## 2023-06-14 DIAGNOSIS — Z01.818 PRE-OP TESTING: Primary | ICD-10-CM

## 2023-06-14 LAB
ANION GAP SERPL CALCULATED.3IONS-SCNC: 5 MMOL/L (ref 4–13)
ATRIAL RATE: 73 BPM
BUN SERPL-MCNC: 10 MG/DL (ref 5–25)
CALCIUM SERPL-MCNC: 10.4 MG/DL (ref 8.4–10.2)
CHLORIDE SERPL-SCNC: 106 MMOL/L (ref 96–108)
CO2 SERPL-SCNC: 26 MMOL/L (ref 21–32)
CREAT SERPL-MCNC: 0.56 MG/DL (ref 0.6–1.3)
ERYTHROCYTE [DISTWIDTH] IN BLOOD BY AUTOMATED COUNT: 14.1 % (ref 11.6–15.1)
GFR SERPL CREATININE-BSD FRML MDRD: 116 ML/MIN/1.73SQ M
GLUCOSE P FAST SERPL-MCNC: 96 MG/DL (ref 65–99)
HCT VFR BLD AUTO: 36.2 % (ref 34.8–46.1)
HGB BLD-MCNC: 11.3 G/DL (ref 11.5–15.4)
MCH RBC QN AUTO: 27.2 PG (ref 26.8–34.3)
MCHC RBC AUTO-ENTMCNC: 31.2 G/DL (ref 31.4–37.4)
MCV RBC AUTO: 87 FL (ref 82–98)
P AXIS: 18 DEGREES
PLATELET # BLD AUTO: 410 THOUSANDS/UL (ref 149–390)
PMV BLD AUTO: 9.3 FL (ref 8.9–12.7)
POTASSIUM SERPL-SCNC: 4 MMOL/L (ref 3.5–5.3)
PR INTERVAL: 158 MS
QRS AXIS: -8 DEGREES
QRSD INTERVAL: 98 MS
QT INTERVAL: 384 MS
QTC INTERVAL: 423 MS
RBC # BLD AUTO: 4.15 MILLION/UL (ref 3.81–5.12)
SODIUM SERPL-SCNC: 137 MMOL/L (ref 135–147)
T WAVE AXIS: 7 DEGREES
VENTRICULAR RATE: 73 BPM
WBC # BLD AUTO: 12.66 THOUSAND/UL (ref 4.31–10.16)

## 2023-06-14 PROCEDURE — 85027 COMPLETE CBC AUTOMATED: CPT

## 2023-06-14 PROCEDURE — 93005 ELECTROCARDIOGRAM TRACING: CPT

## 2023-06-14 PROCEDURE — 36415 COLL VENOUS BLD VENIPUNCTURE: CPT

## 2023-06-14 PROCEDURE — 93010 ELECTROCARDIOGRAM REPORT: CPT | Performed by: INTERNAL MEDICINE

## 2023-06-14 PROCEDURE — 80048 BASIC METABOLIC PNL TOTAL CA: CPT

## 2023-06-19 NOTE — PRE-PROCEDURE INSTRUCTIONS
Pre-Surgery Instructions:   Medication Instructions   • acetaminophen (TYLENOL) 325 mg tablet Uses PRN- OK to take day of surgery   • ferrous sulfate 220 (44 Fe) mg/5 mL solution Hold day of surgery  • ibuprofen (MOTRIN) 600 mg tablet Stop taking 5 days prior to surgery  • lidocaine (LIDODERM) 5 % Hold day of surgery  Medication instructions for day surgery reviewed  Please use only a sip of water to take your instructed medications  Avoid all over the counter vitamins, supplements and NSAIDS for one week prior to surgery per anesthesia guidelines  Tylenol is ok to take as needed  You will receive a call one business day prior to surgery with an arrival time and hospital directions  If your surgery is scheduled on a Monday, the hospital will be calling you on the Friday prior to your surgery  If you have not heard from anyone by 8pm, please call the hospital supervisor through the hospital  at [phone]  [place] [phone])  Do not eat or drink anything after midnight the night before your surgery, including candy, mints, lifesavers, or chewing gum  Do not drink alcohol 24hrs before your surgery  Try not to smoke at least 24hrs before your surgery  Follow the pre surgery showering instructions as listed in the [facility] Surgical Experience Booklet” or otherwise provided by your surgeon's office  Do not shave the surgical area 24 hours before surgery  Do not apply any lotions, creams, including makeup, cologne, deodorant, or perfumes after showering on the day of your surgery  No contact lenses, eye make-up, or artificial eyelashes  Remove nail polish, including gel polish, and any artificial, gel, or acrylic nails if possible  Remove all jewelry including rings and body piercing jewelry  Wear causal clothing that is easy to take on and off  Consider your type of surgery  Keep any valuables, jewelry, piercings at home   Please bring any specially ordered equipment (sling, braces) if indicated  Arrange for a responsible person to drive you to and from the hospital on the day of your surgery  Visitor Guidelines discussed  Call the surgeon's office with any new illnesses, exposures, or additional questions prior to surgery  Please reference your [facility] Surgical Experience Booklet” for additional information to prepare for your upcoming surgery

## 2023-06-23 ENCOUNTER — HOSPITAL ENCOUNTER (OUTPATIENT)
Facility: HOSPITAL | Age: 43
Setting detail: OUTPATIENT SURGERY
Discharge: HOME | End: 2023-06-23
Attending: OBSTETRICS & GYNECOLOGY | Admitting: OBSTETRICS & GYNECOLOGY
Payer: COMMERCIAL

## 2023-06-23 ENCOUNTER — ANESTHESIA (OUTPATIENT)
Dept: PERIOP | Facility: HOSPITAL | Age: 43
End: 2023-06-23
Payer: COMMERCIAL

## 2023-06-23 ENCOUNTER — ANESTHESIA EVENT (OUTPATIENT)
Dept: PERIOP | Facility: HOSPITAL | Age: 43
End: 2023-06-23
Payer: COMMERCIAL

## 2023-06-23 VITALS
HEIGHT: 67 IN | OXYGEN SATURATION: 99 % | BODY MASS INDEX: 44.46 KG/M2 | RESPIRATION RATE: 18 BRPM | TEMPERATURE: 97.3 F | HEART RATE: 72 BPM | WEIGHT: 283.29 LBS | SYSTOLIC BLOOD PRESSURE: 108 MMHG | DIASTOLIC BLOOD PRESSURE: 75 MMHG

## 2023-06-23 DIAGNOSIS — N92.0 MENORRHAGIA WITH REGULAR CYCLE: ICD-10-CM

## 2023-06-23 DIAGNOSIS — G89.18 POSTOPERATIVE PAIN: Primary | ICD-10-CM

## 2023-06-23 PROBLEM — Z98.890 S/P ENDOMETRIAL ABLATION: Status: ACTIVE | Noted: 2023-06-23

## 2023-06-23 LAB
EXT PREGNANCY TEST URINE: NEGATIVE
EXT. CONTROL: NORMAL

## 2023-06-23 PROCEDURE — 58563 HYSTEROSCOPY ABLATION: CPT | Performed by: OBSTETRICS & GYNECOLOGY

## 2023-06-23 PROCEDURE — 88305 TISSUE EXAM BY PATHOLOGIST: CPT | Performed by: STUDENT IN AN ORGANIZED HEALTH CARE EDUCATION/TRAINING PROGRAM

## 2023-06-23 PROCEDURE — 81025 URINE PREGNANCY TEST: CPT | Performed by: OBSTETRICS & GYNECOLOGY

## 2023-06-23 RX ORDER — ONDANSETRON 2 MG/ML
4 INJECTION INTRAMUSCULAR; INTRAVENOUS EVERY 6 HOURS PRN
Status: DISCONTINUED | OUTPATIENT
Start: 2023-06-23 | End: 2023-06-23 | Stop reason: HOSPADM

## 2023-06-23 RX ORDER — LIDOCAINE HYDROCHLORIDE 10 MG/ML
INJECTION, SOLUTION EPIDURAL; INFILTRATION; INTRACAUDAL; PERINEURAL AS NEEDED
Status: DISCONTINUED | OUTPATIENT
Start: 2023-06-23 | End: 2023-06-23

## 2023-06-23 RX ORDER — OXYCODONE HYDROCHLORIDE 5 MG/1
2.5 TABLET ORAL EVERY 4 HOURS PRN
Status: DISCONTINUED | OUTPATIENT
Start: 2023-06-23 | End: 2023-06-23 | Stop reason: HOSPADM

## 2023-06-23 RX ORDER — IBUPROFEN 600 MG/1
600 TABLET ORAL EVERY 6 HOURS PRN
Qty: 30 TABLET | Refills: 0 | Status: SHIPPED | OUTPATIENT
Start: 2023-06-23

## 2023-06-23 RX ORDER — SODIUM CHLORIDE, SODIUM LACTATE, POTASSIUM CHLORIDE, CALCIUM CHLORIDE 600; 310; 30; 20 MG/100ML; MG/100ML; MG/100ML; MG/100ML
INJECTION, SOLUTION INTRAVENOUS CONTINUOUS PRN
Status: DISCONTINUED | OUTPATIENT
Start: 2023-06-23 | End: 2023-06-23

## 2023-06-23 RX ORDER — PROPOFOL 10 MG/ML
INJECTION, EMULSION INTRAVENOUS AS NEEDED
Status: DISCONTINUED | OUTPATIENT
Start: 2023-06-23 | End: 2023-06-23

## 2023-06-23 RX ORDER — ONDANSETRON 2 MG/ML
INJECTION INTRAMUSCULAR; INTRAVENOUS AS NEEDED
Status: DISCONTINUED | OUTPATIENT
Start: 2023-06-23 | End: 2023-06-23

## 2023-06-23 RX ORDER — HYDROMORPHONE HCL/PF 1 MG/ML
0.5 SYRINGE (ML) INJECTION
Status: DISCONTINUED | OUTPATIENT
Start: 2023-06-23 | End: 2023-06-23 | Stop reason: HOSPADM

## 2023-06-23 RX ORDER — LIDOCAINE HYDROCHLORIDE AND EPINEPHRINE 10; 10 MG/ML; UG/ML
INJECTION, SOLUTION INFILTRATION; PERINEURAL AS NEEDED
Status: DISCONTINUED | OUTPATIENT
Start: 2023-06-23 | End: 2023-06-23 | Stop reason: HOSPADM

## 2023-06-23 RX ORDER — OXYCODONE HYDROCHLORIDE 5 MG/1
5 TABLET ORAL EVERY 4 HOURS PRN
Qty: 5 TABLET | Refills: 0 | Status: SHIPPED | OUTPATIENT
Start: 2023-06-23 | End: 2023-07-03

## 2023-06-23 RX ORDER — DEXAMETHASONE SODIUM PHOSPHATE 10 MG/ML
INJECTION, SOLUTION INTRAMUSCULAR; INTRAVENOUS AS NEEDED
Status: DISCONTINUED | OUTPATIENT
Start: 2023-06-23 | End: 2023-06-23

## 2023-06-23 RX ORDER — ACETAMINOPHEN 325 MG/1
975 TABLET ORAL EVERY 6 HOURS PRN
Status: DISCONTINUED | OUTPATIENT
Start: 2023-06-23 | End: 2023-06-23 | Stop reason: HOSPADM

## 2023-06-23 RX ORDER — ONDANSETRON 2 MG/ML
4 INJECTION INTRAMUSCULAR; INTRAVENOUS ONCE AS NEEDED
Status: DISCONTINUED | OUTPATIENT
Start: 2023-06-23 | End: 2023-06-23 | Stop reason: HOSPADM

## 2023-06-23 RX ORDER — FENTANYL CITRATE 50 UG/ML
INJECTION, SOLUTION INTRAMUSCULAR; INTRAVENOUS AS NEEDED
Status: DISCONTINUED | OUTPATIENT
Start: 2023-06-23 | End: 2023-06-23

## 2023-06-23 RX ORDER — MIDAZOLAM HYDROCHLORIDE 2 MG/2ML
INJECTION, SOLUTION INTRAMUSCULAR; INTRAVENOUS AS NEEDED
Status: DISCONTINUED | OUTPATIENT
Start: 2023-06-23 | End: 2023-06-23

## 2023-06-23 RX ORDER — SODIUM CHLORIDE, SODIUM LACTATE, POTASSIUM CHLORIDE, CALCIUM CHLORIDE 600; 310; 30; 20 MG/100ML; MG/100ML; MG/100ML; MG/100ML
125 INJECTION, SOLUTION INTRAVENOUS CONTINUOUS
Status: CANCELLED | OUTPATIENT
Start: 2023-06-23

## 2023-06-23 RX ORDER — KETOROLAC TROMETHAMINE 30 MG/ML
INJECTION, SOLUTION INTRAMUSCULAR; INTRAVENOUS AS NEEDED
Status: DISCONTINUED | OUTPATIENT
Start: 2023-06-23 | End: 2023-06-23

## 2023-06-23 RX ORDER — PROMETHAZINE HYDROCHLORIDE 25 MG/ML
6.25 INJECTION, SOLUTION INTRAMUSCULAR; INTRAVENOUS ONCE
Status: DISCONTINUED | OUTPATIENT
Start: 2023-06-23 | End: 2023-06-23 | Stop reason: HOSPADM

## 2023-06-23 RX ORDER — FENTANYL CITRATE/PF 50 MCG/ML
50 SYRINGE (ML) INJECTION
Status: DISCONTINUED | OUTPATIENT
Start: 2023-06-23 | End: 2023-06-23 | Stop reason: HOSPADM

## 2023-06-23 RX ADMIN — ONDANSETRON 4 MG: 2 INJECTION INTRAMUSCULAR; INTRAVENOUS at 13:08

## 2023-06-23 RX ADMIN — OXYCODONE HYDROCHLORIDE 2.5 MG: 5 TABLET ORAL at 14:50

## 2023-06-23 RX ADMIN — PROPOFOL 200 MG: 10 INJECTION, EMULSION INTRAVENOUS at 13:08

## 2023-06-23 RX ADMIN — SODIUM CHLORIDE, SODIUM LACTATE, POTASSIUM CHLORIDE, AND CALCIUM CHLORIDE: .6; .31; .03; .02 INJECTION, SOLUTION INTRAVENOUS at 11:17

## 2023-06-23 RX ADMIN — FENTANYL CITRATE 25 MCG: 50 INJECTION, SOLUTION INTRAMUSCULAR; INTRAVENOUS at 13:15

## 2023-06-23 RX ADMIN — KETOROLAC TROMETHAMINE 30 MG: 30 INJECTION, SOLUTION INTRAMUSCULAR at 13:08

## 2023-06-23 RX ADMIN — FENTANYL CITRATE 50 MCG: 50 INJECTION, SOLUTION INTRAMUSCULAR; INTRAVENOUS at 13:08

## 2023-06-23 RX ADMIN — FENTANYL CITRATE 25 MCG: 50 INJECTION, SOLUTION INTRAMUSCULAR; INTRAVENOUS at 13:27

## 2023-06-23 RX ADMIN — LIDOCAINE HYDROCHLORIDE 50 MG: 10 INJECTION, SOLUTION EPIDURAL; INFILTRATION; INTRACAUDAL; PERINEURAL at 13:08

## 2023-06-23 RX ADMIN — DEXAMETHASONE SODIUM PHOSPHATE 10 MG: 10 INJECTION, SOLUTION INTRAMUSCULAR; INTRAVENOUS at 13:08

## 2023-06-23 RX ADMIN — SODIUM CHLORIDE, SODIUM LACTATE, POTASSIUM CHLORIDE, AND CALCIUM CHLORIDE: .6; .31; .03; .02 INJECTION, SOLUTION INTRAVENOUS at 13:43

## 2023-06-23 RX ADMIN — MIDAZOLAM HYDROCHLORIDE 2 MG: 1 INJECTION, SOLUTION INTRAMUSCULAR; INTRAVENOUS at 13:03

## 2023-06-23 NOTE — INTERVAL H&P NOTE
H&P reviewed  After examining the patient I find no changes in the patients condition since the H&P had been written      Vitals:    06/23/23 1109   BP: 111/73   Pulse: 75   Resp: 22   Temp: (!) 97 °F (36 1 °C)   SpO2: 97%

## 2023-06-23 NOTE — ANESTHESIA PREPROCEDURE EVALUATION
"Procedure:  DILATATION AND CURETTAGE (D&C) WITH HYSTEROSCOPY (Uterus)  ABLATION ENDOMETRIAL NOVASURE (Uterus)    Relevant Problems   HEMATOLOGY   (+) Iron deficiency anemia      Endocrine   (+) Ovarian cyst, right      Other   (+) Endometrial thickening on ultrasound        Physical Exam    Airway    Mallampati score: II  TM Distance: >3 FB  Neck ROM: full     Dental   No notable dental hx     Cardiovascular      Pulmonary      Other Findings        Anesthesia Plan  ASA Score- 3     Anesthesia Type- general with ASA Monitors  Additional Monitors:   Airway Plan: LMA  Plan Factors-    Chart reviewed  EKG reviewed  Existing labs reviewed  Patient summary reviewed  Patient is not a current smoker  Induction- intravenous  Postoperative Plan-   Planned trial extubation    Informed Consent- Anesthetic plan and risks discussed with patient  I personally reviewed this patient with the CRNA  Discussed and agreed on the Anesthesia Plan with the CRNA  Jessica Morley           VITALS  /73   Pulse 75   Temp (!) 97 °F (36 1 °C) (Temporal)   Resp 22   Ht 5' 7\" (1 702 m)   Wt 129 kg (283 lb 4 7 oz)   LMP 06/23/2023 (Exact Date) Comment: Pt chronically bleeding  SpO2 97%   BMI 44 37 kg/m²   BP Readings from Last 3 Encounters:   06/23/23 111/73   05/30/23 124/82   05/23/23 116/86     LABS  Results from Last 12 Months   Lab Units 06/14/23  1437 04/22/23 1932   SODIUM mmol/L 137 137   POTASSIUM mmol/L 4 0 3 7   CHLORIDE mmol/L 106 105   CO2 mmol/L 26 24   ANION GAP mmol/L 5 8   BUN mg/dL 10 10   CREATININE mg/dL 0 56* 0 68   CALCIUM mg/dL 10 4* 10 5*   GLUCOSE RANDOM mg/dL  --  137     Results from Last 12 Months   Lab Units 06/14/23  1437 04/22/23  1932   WBC Thousand/uL 12 66* 10 30*   HEMOGLOBIN g/dL 11 3* 10 8*   HEMATOCRIT % 36 2 35 3   PLATELETS Thousands/uL 410* 434*     Results from Last 12 Months   Lab Units 04/22/23 1932   INR  0 95       ECG  Encounter Date: 06/14/23   ECG 12 lead   Result " Value    Ventricular Rate 73    Atrial Rate 73    DC Interval 158    QRSD Interval 98    QT Interval 384    QTC Interval 423    P Axis 18    QRS Axis -8    T Wave Axis 7    Narrative    Normal sinus rhythm  Normal ECG  No previous ECGs available  Confirmed by Macarena Long ([id number]) on 6/14/2023 10:10:16 PM     No results found for this or any previous visit  ECHOCARDIOGRAPHY, OTHER CARDIAC TESTING, AND IMAGING  n/a    ANESTHESIA RISK-BENEFIT DISCUSSION  BENEFITS INCLUDE THE FOLLOWING ([facility] [id number], PMID [id number]): (1) Involvement of a dedicated anesthesia team reduces mortality and morbidity for major surgeries, (2) The team provides as much analgesia/sedation/amnesia/akinesia as is reasonably possible, and (3) The team strives to reduce pain and discomfort as much as reasonably possible  RISKS (AND PLANS TO MITIGATE RISKS) INCLUDES THE FOLLOWING:    Neurologic Risks: IntraOp awareness (Risk is ~1:1,000 - 1:14,000; PMID [id number]), Stroke (Risk ~<0 1-2% for most cases; PMID [id number]), and POCD  Airway and Pulmonary Risks: Dental or mouth injury, throat pain, critical hypoxia, pneumothorax, prolonged intubation, post-op respiratory compromise  • Airway/Intubation risks and information: BMI 40 or higher and No prior advanced airway notes in [facility]  • Major ARISCAT risk factors include: none, (Score 0-2= Low risk, 1 6%)  Cardiovascular Risks: Hypotension, arrhythmias, and yuan-op cardiac injury (MACE)  In cases where specialized vascular access is needed, then additional risks including bleeding, infection, or injury to adjacent structures  If a bypass circuit is needed then risk of stroke, blood clot, and vasoplegia  • Signs of active, severe cardiac instability: none  • Bryan's RCRI score items: none  • MACE risk:  An RCRI score of 0= 0 4% risk  • Are yuan-op or intra-op beta blockers indicated?: no   FEN/GI Risks: Aspiration (Approximately 0 5% risk per the IRIS trial) and PONV (10-80% depending on Apfel criteria)  • Does the patient meet ASA NPO guidelines?: Yes   Adverse drug reaction risks: Allergic reactions, overdoses, drug-drug interactions, injury to a fetus or  in pregnant or breastfeeding patients, increased risk of injury or accident if performing potentially hazardous tasks before anesthetic medications have been fully excreted/metabolized    • Recent medications relevant to anesthetic plan: none  • Personal or family history of anesthesia complications: no  • Pregnancy Status: Negative   Mortality risks associated with anesthesia based on ASA-PS (PMID [id number]): ASA-PS III: Estimated risk 1:3,500

## 2023-06-23 NOTE — ANESTHESIA POSTPROCEDURE EVALUATION
Post-Op Assessment Note    CV Status:  Stable  Pain Score: 0    Pain management: adequate  Multimodal analgesia used between 6 hours prior to anesthesia start to PACU discharge    Mental Status:  Alert and awake   Hydration Status:  Euvolemic   PONV Controlled:  None   Airway Patency:  Patent      Post Op Vitals Reviewed: Yes      Staff: CRNA         No notable events documented      BP   112/75   Temp  97 2   Pulse  79   Resp   16   SpO2   98

## 2023-06-23 NOTE — OP NOTE
OPERATIVE REPORT  PATIENT NAME: Emerson Schroeder    :  1980  MRN: [id number]  Pt Location: [facility] OR ROOM 03    SURGERY DATE: 2023    Surgeon(s) and Role:     * Jan Dyer MD - Primary    Preop Diagnosis:  Menorrhagia with regular cycle [N92 0]    Post-Op Diagnosis Codes:     * Menorrhagia with regular cycle [N92 0]    Procedure(s):  DILATATION AND CURETTAGE (D&C) WITH HYSTEROSCOPY  ABLATION ENDOMETRIAL Anastacia Prima    Specimen(s):  ID Type Source Tests Collected by Time Destination   1 : endometrial curretings Tissue Endometrium TISSUE EXAM Jan Dyer MD 2023 1322        Estimated Blood Loss:   Minimal    Drains:  * No LDAs found *    Anesthesia Type:   General    Operative Indications:  Menorrhagia with regular cycle [N92 0]      Operative Findings:  1  Parous cervix, very anterior  2  Very thick endometrium lining  3  Uterine cavity 6 0 Width 4 8 Power 138 Time 6:21    Complications:       Procedure and Technique:  Patient was identified in the holding area and brought back to the operating room where general anesthesia was initiated  Bilateral lower extremity compression boots were placed prior to initiation of anesthesia  Patient was placed in the dorsal lithotomy position and prepped and draped in a sterile fashion  Timeout procedure was completed  Bladder was emptied with a sterile straight catheter  Exam under anesthesia was completed and above findings noted  A weighted speculum and nicole were placed into the vagina for visualization of the cervix  A single toothed tenaculum was used to grasp the anterior lip of the cervix  The uterus was sounded and the cervix was dilated to accommodate the insertion of the hysteroscope  The hysteroscope was inserted and using normal saline as the distension fluid the endometrium was visualized  The above findings were noted      The hysteroscope was removed and the endometrial currettings were obtained and sent to pathology for evaluation  The Novasure device was inserted and ablation was completed  All instruments were removed from the vagina and good hemostasis was noted  The patient was extubated and brought the the recovery room in stable condition  All sponge, lap and needle counts were correct  I was present for the entire procedure , I was present for all critical portions of the procedure  and A qualified resident physician was not available      Patient Disposition:  PACU         SIGNATURE: Ashutosh Rahman MD  DATE: June 23, 2023  TIME: 1:35 PM

## 2023-06-23 NOTE — DISCHARGE INSTRUCTIONS
Postoperative Opioid Use  You may have been prescribed an opioid pain medication to assist with your postoperative pain control  Our goal is for your pain to be controlled, not for you to be completely pain free  Being pain free after surgery takes time  While this medication is excellent at treating postoperative pain, it has several side effects and addictive properties  Here is a suggested plan to minimize opiate use:    Use non-opiate methods of pain control first  Use ice packs over your incision to reduce pain and swelling  This will help minimize the need for medications  For the first few days after your surgery, take 650 mg of Acetaminophen (Tylenol) every 6 hours regularly  In addition, take 600 mg of Ibuprofen (Motrin) every 6 hours regularly  Using these medications will help keep you from getting into severe pain and can reduce how much narcotic pain medication you need even if they are not enough to control your pain  Use opiate medication when the other methods are not adequate  For the first few days after surgery, you may need additional pain relief   You may take your opiate medication every 4-6 hours  This is the first medication you should stop taking as your pain improves  After the first few days, you should not require opiate medications  You may continue to take 650 mg of Acetaminophen (Tylenol) every 6 hours regularly  You may take Ibuprofen (Motrin) 600 mg as needed for additional pain relief  If you have unused tablets of the opiate medication, see below  Do not take more than 4,000 mg of Acetaminophen (Tylenol) in a 24 hour period  Please ask your doctor for guidance on amount of Acetaminophen (Tylenol) it is safe to take if you have known liver disease  If you have a sensitive stomach, please take Ibuprofen (Motrin) with food   Please ask your doctor for guidance on safety of Ibuprofen (Motrin) if you have known kidney disease or if you have a history of weight loss surgery  Side effects of opioids  Do not drive or operate heavy machinery while using opioid pain medications   A few days of postoperative opioid use is safe while breastfeeding   Opioids can cause constipation  Please be sure to drink lots of water, and your doctor may recommend use of a stool softener, such as docusate sodium (Colace) 100 mg twice a day or Senna 8 6 mg every night  Disposing of your unused pills  It is very likely that you will not need all of the pills you have been prescribed  They should not be used to treat other pain  They should not be shared with other people  They should not be saved  It is not safe to take  medications  It is not safe to keep them around the house  It is not safe to keep them within reach of children or pets  You should dispose of unused pills by taking them to a Controlled Substance Disposal Site  Controlled Substance Disposal Sites   The following locations are controlled substance disposal sites  You may walk in to any location with unused pills and dispose of them safely and anonymously  Pharmacies that accept unused pills  [facility]  [address] [place] [address], [address] [address] [address], [address]   [address] [place] [address]  Please call your local pharmacy to see if they also have a Controlled Substance Disposal location and are not on this list     Police departments may accept unused pills  Most local police departments have a drop box on their premises  Please contact your local police department for more information  Information can also be found here: https://safe  pharmacy/drug-disposal/ [safe  pharmacy]    National Prescription Drug Take Back Day  There is a biannual National Prescription Drug Take Back Day, usually in April and October, when there are more sites available than usual, including most [facility]’s locations  Information for that can be found here: https://takebackday tomi gov/ [takebackday tomi gov]  You can find more information about prescription disposal here:   https://safe  pharmacy/drug-disposal/ [safe  pharmacy]   [employer] cy  aspx [ddap pa gov]    Thank you

## 2023-06-25 ENCOUNTER — NURSE TRIAGE (OUTPATIENT)
Dept: OTHER | Facility: OTHER | Age: 43
End: 2023-06-25

## 2023-06-25 NOTE — TELEPHONE ENCOUNTER
"    Reason for Disposition  • MODERATE vaginal bleeding (e g , soaking 1 pad or tampon per hour and present > 6 hours; 1 menstrual cup every 6 hours)    Answer Assessment - Initial Assessment Questions  1  AMOUNT: \"Describe the bleeding that you are having  \"   - MODERATE:   filling a pad in 1/hr with nickel size clots    2  ONSET: \"When did the bleeding begin? \" \"Is it continuing now? \"      6/24    3  MENSTRUAL PERIOD: \"When was the last normal menstrual period? \" \"How is this different than your period? \"  Unsure, pt has heavy and prolonged periods    4  REGULARITY: \"How regular are your periods? \"  Regular       5  ABDOMINAL PAIN: \"Do you have any pain? \" \"How bad is the pain? \"  (e g , Scale 1-10; mild, moderate, or severe)    - MILD (1-3): doesn't interfere with normal activities, abdomen soft and not tender to touch     - MODERATE (4-7): interferes with normal activities or awakens from sleep, tender to touch     - SEVERE (8-10): excruciating pain, doubled over, unable to do any normal activities     6/10    6  PREGNANCY: \"Could you be pregnant? \" Debi Roth you sexually active? \" \"Did you recently give birth? \"   no    7  BREASTFEEDING: Debi Roth you breastfeeding? \"     No    8  HORMONES: Debi Roth you taking any hormone medications, prescription or OTC? \" (e g , birth control pills, estrogen)   Denies    9  BLOOD THINNERS: \"Do you take any blood thinners? \" (e g , Coumadin/warfarin, Pradaxa/dabigatran, aspirin)  Denies    10  CAUSE: \"What do you think is causing the bleeding? \" (e g , recent gyn surgery, recent gyn procedure; known bleeding disorder, cervical cancer, polycystic ovarian disease, fibroids)         D&C) WITH HYSTEROSCOPY (Uterus)  ABLATION     11  HEMODYNAMIC STATUS: \"Are you weak or feeling lightheaded? \" If Yes, ask: \"Can you stand and walk normally? \"   Denies    12  OTHER SYMPTOMS: \"What other symptoms are you having with the bleeding? \" (e g , passed tissue, vaginal discharge, fever, menstrual-type cramps)       " Abdominal cramps and lower back pain      Given Codeine and motrin- has not taken anything today    Every time patient stands up, she feels blood come out  Protocols used: VAGINAL BLEEDING - ABNORMAL-ADULT-AH    Denies fever, chills, or bad odor  Not due for her period per patient  Paged on call provider  Provider stated patient can trial motrin 600mg q6h   If bleeding does not improve or slow down, pt should go to [facility] for evaluation  Informed patient and she verbalized understanding

## 2023-07-05 PROCEDURE — 88305 TISSUE EXAM BY PATHOLOGIST: CPT | Performed by: STUDENT IN AN ORGANIZED HEALTH CARE EDUCATION/TRAINING PROGRAM

## 2023-07-06 ENCOUNTER — OFFICE VISIT (OUTPATIENT)
Dept: BARIATRICS | Facility: CLINIC | Age: 43
End: 2023-07-06
Payer: COMMERCIAL

## 2023-07-06 VITALS
HEIGHT: 67 IN | WEIGHT: 282.8 LBS | HEART RATE: 62 BPM | SYSTOLIC BLOOD PRESSURE: 122 MMHG | DIASTOLIC BLOOD PRESSURE: 82 MMHG | RESPIRATION RATE: 16 BRPM | BODY MASS INDEX: 44.39 KG/M2

## 2023-07-06 DIAGNOSIS — E66.01 MORBID (SEVERE) OBESITY DUE TO EXCESS CALORIES (HCC): ICD-10-CM

## 2023-07-06 DIAGNOSIS — E66.01 OBESITY, CLASS III, BMI 40-49.9 (MORBID OBESITY) (HCC): ICD-10-CM

## 2023-07-06 DIAGNOSIS — Z01.818 ENCOUNTER FOR OTHER PREPROCEDURAL EXAMINATION: Primary | ICD-10-CM

## 2023-07-06 PROCEDURE — 99204 OFFICE O/P NEW MOD 45 MIN: CPT | Performed by: SURGERY

## 2023-07-06 NOTE — PROGRESS NOTES
BARIATRIC INITIAL CONSULT - BARIATRIC SURGERY    Stas Copeland 40 y.o. female MRN: [id number]  Unit/Bed#:  Encounter: [id number]      HPI:  Stas Copeland is a 40 y.o. female who presents with a longstanding history of morbid obesity and inability to sustain a meaningful weight loss. She works in [employer] 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 She is  and has two daughters and one son. She desires to pursue metabolic and bariatric surgery to improve her health. Denies GERD. Intermittent NSAIDs. Denies DVT/PE. Denies tobacco.  Here today to discuss bariatric options. Visit type: initial visit    Symptoms: inability to loss weight    Associated Symptoms: depressed mood and poor self esteem    Associated Conditions: none  Disease Complications: none  Weight Loss Interest: high    Exercise Frequency:daily  Types of Exercise: walking      Review of Systems   Gastrointestinal: Negative for abdominal pain. All other systems reviewed and are negative.       Historical Information   Past Medical History:   Diagnosis Date   • Anemia    • CTS (carpal tunnel syndrome)    • Hemorrhoids    • History of transfusion     post partum     Past Surgical History:   Procedure Laterality Date   • CARPAL TUNNEL RELEASE     •  SECTION     • CT HYSTEROSCOPY BX ENDOMETRIUM&/POLYPC W/WO D&C N/A 2023    Procedure: DILATATION AND CURETTAGE (D&C) WITH HYSTEROSCOPY;  Surgeon: Tiffanie Shearer MD;  [facility];  Service: Gynecology   • CT HYSTEROSCOPY ENDOMETRIAL ABLATION N/A 2023    Procedure: Walda Fails;  Surgeon: Tiffanie Shearer MD;  [facility];  Service: Gynecology   • TUBAL LIGATION       Social History   Social History     Substance and Sexual Activity   Alcohol Use No     Social History     Substance and Sexual Activity   Drug Use No     Social History     Tobacco Use   Smoking Status Never   Smokeless Tobacco Never     Family History: non-contributory    Meds/Allergies   all medications and allergies reviewed  Allergies   Allergen Reactions   • Other Shortness Of Breath     Dog dander   • Wound Dressing Adhesive Hives   • Latex Rash       Objective       Current Vitals:   /82 (BP Location: Left arm, Patient Position: Sitting, Cuff Size: Large)   Pulse 62   Resp 16   Ht 5' 6.54" (1.69 m)   Wt 128 kg (282 lb 12.8 oz)   LMP 06/23/2023 (Exact Date) Comment: Pt chronically bleeding  BMI 44.91 kg/m²       Invasive Devices     None                 Physical Exam  Constitutional:       Appearance: Normal appearance. HENT:      Head: Atraumatic. Nose: No rhinorrhea. Eyes:      Extraocular Movements: Extraocular movements intact. Cardiovascular:      Rate and Rhythm: Normal rate. Pulmonary:      Effort: Pulmonary effort is normal. No respiratory distress. Abdominal:      General: Abdomen is flat. There is no distension. Musculoskeletal:         General: Normal range of motion. Cervical back: Normal range of motion. Skin:     General: Skin is warm and dry. Neurological:      General: No focal deficit present. Mental Status: She is alert and oriented to person, place, and time. Psychiatric:         Mood and Affect: Mood normal.         Behavior: Behavior normal.         Lab Results: I have personally reviewed pertinent lab results. Imaging: I have personally reviewed pertinent reports. EKG, Pathology, and Other Studies: I have personally reviewed pertinent reports. Assessment/PLAN:    40 y.o. yo female with a long standing h/o of obesity and inability to sustain any meaningful weight loss on her own despite several attempts. She is interested in the Laparoscopic sleeve gastrectomy. Patient has been counseled about the risk of developing gastroesophageal reflux disease (GERD), worsening of current GERD and/or silent reflux.  Patient has also been counseled on the risk of developing Giordano's esophagus (18%). As a result the patient may require treatment with medications, further interventions and possibly additional surgery. Patient will require routine endoscopic surveillance to monitor for these possible complications. As a part of her pre op evaluation, she will be referred to a cardiologist and for a sleep evaluation and consult after successfully completing an evaluation with our pre-certification/, registered dietician and licensed clinical . She needs an EGD to evaluate the anatomy of her GI tract prior to the operation. I have spent over 45 minutes with her face to face in the office today discussing her options and details of the surgery. We have seen an animation of the surgery on the computer that illustrates how the operation is done and how the anatomy will be altered with the procedure. Over 50% of this was coordinating care. She was given the opportunity to ask questions and I have answered all of them. I have discussed and educated the patient with regards to the components of our multidisciplinary program and the importance of compliance and follow up in the post operative period. The patient was also instructed with regards to the importance of behavior modification, nutritional counseling, support meeting attendance and lifestyle changes that are important to ensure success. Although there is a great statistical chance of improvement or even resolution of most of her associated comorbidities, the results vary from patient to patient and they largely depend on her commitment and compliance. Weight loss goal will be determined at the time of her evaluation.     Brittni Andino MD  7/6/2023  11:34 AM

## 2023-07-06 NOTE — LETTER
2023     Tyra Naranjo, [address] [address]    Patient: Sil Gonzalez   YOB: 1980   Date of Visit: 2023       Dear Corrinne Kayser. Wilfrido Patrick: Thank you for referring Ami Pineda to me for evaluation for metabolic and bariatric surgery. Below are my notes for this consultation. If you have questions, please do not hesitate to call me. I look forward to following your patient along with you. Sincerely,        Donita Padgett MD        CC: MIGUEL ANGEL Allan MD  2023 11:38 AM  Sign when Signing Visit      BARIATRIC INITIAL CONSULT - [address] [place] [address] A Jad 40 y.o. female MRN: [id number]  Unit/Bed#:  Encounter: [id number]      HPI:  Sil Gonzalez is a 40 y.o. female who presents with a longstanding history of morbid obesity and inability to sustain a meaningful weight loss. 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 She is  and has two daughters and one son. She desires to pursue metabolic and bariatric surgery to improve her health. Denies GERD. Intermittent NSAIDs. Denies DVT/PE. Denies tobacco.  Here today to discuss bariatric options. Visit type: initial visit    Symptoms: inability to loss weight    Associated Symptoms: depressed mood and poor self esteem    Associated Conditions: none  Disease Complications: none  Weight Loss Interest: high    Exercise Frequency:daily  Types of Exercise: walking      Review of Systems   Gastrointestinal: Negative for abdominal pain. All other systems reviewed and are negative.       Historical Information   Past Medical History:   Diagnosis Date   • Anemia    • CTS (carpal tunnel syndrome)    • Hemorrhoids    • History of transfusion     post partum     Past Surgical History:   Procedure Laterality Date   • CARPAL TUNNEL RELEASE     •  SECTION     • [place] HYSTEROSCOPY BX ENDOMETRIUM&/POLYPC W/WO D&C N/A 2023 Procedure: DILATATION AND CURETTAGE (D&C) WITH HYSTEROSCOPY;  Surgeon: Celia Marcos MD;  [facility];  Service: Gynecology   • [place] HYSTEROSCOPY ENDOMETRIAL ABLATION N/A 6/23/2023    Procedure: Lowery Castleman;  Surgeon: Celia Marcos MD;  [facility];  Service: Gynecology   • TUBAL LIGATION       Social History   Social History     Substance and Sexual Activity   Alcohol Use No     Social History     Substance and Sexual Activity   Drug Use No     Social History     Tobacco Use   Smoking Status Never   Smokeless Tobacco Never     Family History: non-contributory    Meds/Allergies   all medications and allergies reviewed  Allergies   Allergen Reactions   • Other Shortness Of Breath     Dog dander   • Wound Dressing Adhesive Hives   • Latex Rash       Objective       Current Vitals:   /82 (BP Location: Left arm, Patient Position: Sitting, Cuff Size: Large)   Pulse 62   Resp 16   Ht 5' 6.54" (1.69 m)   Wt 128 kg (282 lb 12.8 oz)   LMP 06/23/2023 (Exact Date) Comment: Pt chronically bleeding  BMI 44.91 kg/m²       Invasive Devices       None                   Physical Exam  Constitutional:       Appearance: Normal appearance. HENT:      Head: Atraumatic. Nose: No rhinorrhea. Eyes:      Extraocular Movements: Extraocular movements intact. Cardiovascular:      Rate and Rhythm: Normal rate. Pulmonary:      Effort: Pulmonary effort is normal. No respiratory distress. Abdominal:      General: Abdomen is flat. There is no distension. Musculoskeletal:         General: Normal range of motion. Cervical back: Normal range of motion. Skin:     General: Skin is warm and dry. Neurological:      General: No focal deficit present. Mental Status: She is alert and oriented to person, place, and time.    Psychiatric:         Mood and Affect: Mood normal.         Behavior: Behavior normal.         Lab Results: I have personally reviewed pertinent lab results. Imaging: I have personally reviewed pertinent reports. EKG, Pathology, and Other Studies: I have personally reviewed pertinent reports. Assessment/PLAN:    40 y.o. yo female with a long standing h/o of obesity and inability to sustain any meaningful weight loss on her own despite several attempts. She is interested in the Laparoscopic sleeve gastrectomy. Patient has been counseled about the risk of developing gastroesophageal reflux disease (GERD), worsening of current GERD and/or silent reflux. Patient has also been counseled on the risk of developing Giordano's esophagus (18%). As a result the patient may require treatment with medications, further interventions and possibly additional surgery. Patient will require routine endoscopic surveillance to monitor for these possible complications. As a part of her pre op evaluation, she will be referred to a cardiologist and for a sleep evaluation and consult after successfully completing an evaluation with our pre-certification/, registered dietician and licensed clinical . She needs an EGD to evaluate the anatomy of her GI tract prior to the operation. I have spent over 45 minutes with her face to face in the office today discussing her options and details of the surgery. We have seen an animation of the surgery on the computer that illustrates how the operation is done and how the anatomy will be altered with the procedure. Over 50% of this was coordinating care. She was given the opportunity to ask questions and I have answered all of them. I have discussed and educated the patient with regards to the components of our multidisciplinary program and the importance of compliance and follow up in the post operative period.  The patient was also instructed with regards to the importance of behavior modification, nutritional counseling, support meeting attendance and lifestyle changes that are important to ensure success. Although there is a great statistical chance of improvement or even resolution of most of her associated comorbidities, the results vary from patient to patient and they largely depend on her commitment and compliance. Weight loss goal will be determined at the time of her evaluation.     Jonelle Minor MD  7/6/2023  11:34 AM

## 2023-07-07 NOTE — PATIENT INSTRUCTIONS
BARIATRIC SURGERY EDUCATION CHECKLIST    I have received education related to my bariatric surgery process and understand:    Patients may be required to complete a psychiatric evaluation and receive clearance for surgery from their psychiatrist.    Patients who undergo weight loss surgery are at higher risk of increased mental health concerns and suicide attempts. Patients may be required to complete a full substance abuse evaluation and then complete all treatment recommendations prior to surgery. If diagnosis of abuse/dependence results, patient may be required to remain sober for one (1) year before having bariatric surgery. Patient’s on psychiatric medications should check with their provider to discuss psychiatric medications and the changes in absorption. Patient should discuss all time release medications with provider and take all medications as prescribed. The recommendation is that there is no use of  any tobacco products, Hookah or  vapes for the bariatric post-operation patient. Bariatric surgery patients should not consume alcohol as a post-operative patient as it may increase risk of numerous health conditions including but not limited to alcohol abuse and ulcers. There is a possibility of weight regain if patient does not follow all program guidelines and recommendations. Bariatric surgery patients should exercise thirty (30) to sixty (60) minutes per day to maintain post-surgical weight loss. Research indicates that bariatric patients are more successful when they see a therapist for up to two (2) years post-op. Patients will follow all medical and dietary recommendations provided. Patient will keep all scheduled appointments and follow up with their physician for a minimum of five (5) years. Patient will take all vitamins as recommended. Post-operative vitamins are life-long.     Patient reviewed Bariatric Surgery Education Checklist and agrees they have received education on these issues.

## 2023-07-07 NOTE — PROGRESS NOTES
Bariatric Behavioral Health Evaluation    Presenting Problem: 40year old female ( 1980) here for behavioral health evaluation. Patient had initial consult with Dr. Kalin Wells 23. Patient is wanting to improve her health and wants a lifestyle change. Is the patient seeking Bariatric Surgery Eval? Yes  If yes how long have you researched this surgery option. Patient recently started looking into bariatric surgery after discussing it with her PCP. Realizes Post- Op Requirements? Yes, but would benefit from more education. Pre-morbid level of function and history of present illness: patient reports struggling with her weight since childhood, more increasingly since having children. Psychiatric/Psychological Treatment Diagnosis: Patient denies any current mental health diagnosis or treatment. Patient educated on the benefits of outpatient therapy to develop positive coping skills and habits for success long term, resource list provided. Outpatient Counselor No     Psychiatrist No     Have you had Inpatient Treatment? No    Family Constellation: Patient currently lives with her , 25year old daughter, 15year old daughter, and 8year old son. Trauma/Abuse History:  Patient denies this. Additional comments/stressors related to family/relationships/peer support: Patient identifies her  and her daughter as her support. Patient identifies her health as a current stressor. Physical/Psychological Assessment:     Appearance: appropriate  Sociability: average  Affect: appropriate  Mood: calm  Thought Process: coherent  Speech: normal  Content: no impairment  Orientation: person  Yes , place  Yes , time  Yes , normal attention span  Yes , normal memory  Yes   and normal judgement  Yes   Insight: emotional  good    Risk Assessment:     none    Recommendations: Recommended for surgery  yes    Risk of Harm to Self or Others: Patient denies SI or HI     Observation:     Interviews:  This interview only. Based on the previous information, the client presents the following risk of harm to self or others: low     Note: Patient here for behavioral health evaluation. Patient denies any current mental health diagnosis or treatment. Patient educated on the benefits of outpatient therapy to develop positive coping skills and habits for success long term, resource list provided. History of tubal ligation, therefore reproductive hormonal changes post surgery is not applicable. Patient denies any current substance abuse. Denies tobacco use. Denies alcohol use. Patient educated on the impact of nicotine and alcohol on the post bariatric patient. Patient agrees to abstain from all substances prior to as well as after surgery. Patient meets criteria for surgery at this program and will follow up with SW next month. Patient's insurance will require 6 months of weight checks. Patient will also need to schedule an EGD following the visit today. Patient currently lives with her , 25year old daughter, 15year old daughter, and 8year old son. 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 Patient works 2-5 days per week per lian 7a-4pm. [place] the bus [place] the city.  is a .  is very supportive. 25year old daughter is hoping to be a doctor, just finished her freshman year. Family all works together and support each other. Neighbor will also take the kids and she takes her neighbors kids as well. Patient recently had an ablation 6/23/23 due to heavy periods. This has been since she had her tubal ligation 10 years ago. Patient reports that she was getting extremely emotional. Patient reports since the ablation this has been getting better. Has spent a lot of time not taking care of her health. Patient typically skips breakfast. Discussed the importance of regular meals as well as paying attention to body cues for hunger and satiety.  Patient drinks 10 oz coffee, 48 oz of water, 12 oz rohini, 12 oz sprite or coke. Patient gets about 6-7 hours of sleep, minimal issues. Patient walks daily for 30 minutes. Wants to start to bike ride. Struggles with emotional eating. Discussed positive coping skills. Goals discussed:   1. Increase water intake  2. Be mindful not to skip meals  3. continue to increase activity  4.  Increase positive coping skills  [place] , [facility]

## 2023-07-10 ENCOUNTER — CLINICAL SUPPORT (OUTPATIENT)
Dept: BARIATRICS | Facility: CLINIC | Age: 43
End: 2023-07-10

## 2023-07-10 ENCOUNTER — PREP FOR PROCEDURE (OUTPATIENT)
Dept: BARIATRICS | Facility: CLINIC | Age: 43
End: 2023-07-10

## 2023-07-10 VITALS
RESPIRATION RATE: 18 BRPM | HEIGHT: 67 IN | OXYGEN SATURATION: 98 % | BODY MASS INDEX: 44.01 KG/M2 | HEART RATE: 78 BPM | WEIGHT: 280.4 LBS | SYSTOLIC BLOOD PRESSURE: 118 MMHG | DIASTOLIC BLOOD PRESSURE: 74 MMHG

## 2023-07-10 DIAGNOSIS — E66.01 OBESITY, CLASS III, BMI 40-49.9 (MORBID OBESITY) (HCC): Primary | ICD-10-CM

## 2023-07-10 DIAGNOSIS — Z98.84 BARIATRIC SURGERY STATUS: Primary | ICD-10-CM

## 2023-07-10 DIAGNOSIS — E61.1 IRON DEFICIENCY: ICD-10-CM

## 2023-07-10 DIAGNOSIS — Z01.818 PREOP TESTING: ICD-10-CM

## 2023-07-10 DIAGNOSIS — E66.01 MORBID OBESITY (HCC): Primary | ICD-10-CM

## 2023-07-10 DIAGNOSIS — Z71.89 ENCOUNTER FOR PRE-BARIATRIC SURGERY COUNSELING AND EDUCATION: ICD-10-CM

## 2023-07-10 PROCEDURE — RECHECK

## 2023-07-10 NOTE — PROGRESS NOTES
Bariatric Nutrition Assessment Note- Evaluation    Insurance: 6 required monthly weight checks      Type of surgery    Interested Vertical sleeve gastrectomy  Surgery Date: TBD  Surgeon: Dr Silver Choi 7/6/2023    Nutrition Assessment   Kirt Dietrich  40 y.o.  female   Height: 5'6.5"  Eval Weight: 280.4#   BMI: 44.5  Wt with BMI of 25: 157#  Pre-Op Excess Wt: 123.4#  BMI to Qualify at 41 = 251.5#  PMH includes: Obesity    Pt advised not to gain weight during preop process. Pt encouraged to lose weight via healthy eating and exercise. Pt may follow Liver Shrinking diet 2 weeks prior to DOS depending on BMI at time. This diet will promote weight loss. /74 (BP Location: Left arm, Patient Position: Sitting, Cuff Size: Adult)   Pulse 78   Resp 18   Ht 5' 6.54" (1.69 m)   Wt 127 kg (280 lb 6.4 oz)   LMP 06/23/2023 (Exact Date) Comment: Pt chronically bleeding  SpO2 98%   BMI 44.53 kg/m²     [place] Equation:   Estimated calories to maintain weightt: 2350     Estimated calories for weight loss 8313-1505  ( 1-2# per wk wt loss - sedentary )  Estimated protein needs  grams (1.0-1.5 gms/kg BMI at 25 )   Estimated fluid needs 71-83 oz (30-35 ml/kg BMI at 25 )      NAFLD Fibrosis Score cannot be calculated.  One or more of the required components has not been resulted in the past year      Weight History Reason for WLS: Not able to sustain weight loss  Onset of Obesity: Childhood  Family history of obesity: No  Wt Loss Attempts: Commercial Programs ([employer], Shelia Soria, etc.)  FAD Diets (Cabbage soup, Grapefruit, Cleanse, etc.)  High Protein/Low CHO diets (Atkins, [place], etc.)  Meal Replacements (Medifast, Slim Fast, etc.)  OTC meds/supplements  Patient has tried the above for 6 months or more with insufficient weight loss or weight regain, which is why patient has requested to be evaluated for weight loss surgery today  Maximum Wt Lost: 20#      Review of History and Medications  OTC: None  Past Medical History:   Diagnosis Date   • Anemia    • CTS (carpal tunnel syndrome)    • Hemorrhoids    • History of transfusion     post partum     Past Surgical History:   Procedure Laterality Date   • CARPAL TUNNEL RELEASE     •  SECTION     • OR HYSTEROSCOPY BX ENDOMETRIUM&/POLYPC W/WO D&C N/A 2023    Procedure: DILATATION AND CURETTAGE (D&C) WITH HYSTEROSCOPY;  Surgeon: Claudeen Landsberg, MD;  Location: [facility];  Service: Gynecology   • OR HYSTEROSCOPY ENDOMETRIAL ABLATION N/A 2023    Procedure: Monica Zhang;  Surgeon: Claudeen Landsberg, MD;  Location: [facility];  Service: Gynecology   • TUBAL LIGATION       Social History     Socioeconomic History   • Marital status: Single     Spouse name: None   • Number of children: None   • Years of education: None   • Highest education level: None   Occupational History   • None   Tobacco Use   • Smoking status: Never   • Smokeless tobacco: Never   Vaping Use   • Vaping Use: Never used   Substance and Sexual Activity   • Alcohol use: No   • Drug use: No   • Sexual activity: Yes     Partners: Male     Birth control/protection: Female Sterilization   Other Topics Concern   • None   Social History Narrative   • None     Social Determinants of Health     Financial Resource Strain: Not on file   Food Insecurity: Not on file   Transportation Needs: Not on file   Physical Activity: Not on file   Stress: Not on file   Social Connections: Not on file   Intimate Partner Violence: Not on file   Housing Stability: Not on file       Current Outpatient Medications:   •  ibuprofen (MOTRIN) 600 mg tablet, Take by mouth every 6 (six) hours as needed for mild pain, Disp: , Rfl:   •  lidocaine (LIDODERM) 5 %, Apply 1 patch topically daily Remove & Discard patch within 12 hours or as directed by MD, Disp: 30 patch, Rfl: 0  •  acetaminophen (TYLENOL) 325 mg tablet, Take 1,000 mg by mouth every 6 (six) hours as needed (Patient not taking: Reported on 7/6/2023), Disp: , Rfl:   •  diazepam (VALIUM) 5 mg tablet, Take 1 tablet (5 mg total) by mouth every 6 (six) hours as needed for muscle spasms for up to 3 days (Patient not taking: Reported on 4/25/2023), Disp: 12 tablet, Rfl: 0  •  ferrous sulfate 220 (44 Fe) mg/5 mL solution, Take 220 mg by mouth (Patient not taking: Reported on 7/10/2023), Disp: , Rfl:   •  ibuprofen (MOTRIN) 600 mg tablet, Take 1 tablet (600 mg total) by mouth every 6 (six) hours as needed for moderate pain (Patient not taking: Reported on 7/6/2023), Disp: 30 tablet, Rfl: 0  Food Intake and Lifestyle Assessment   Food Intake Assessment completed via usual diet recall  Breakfast: Coffee - skips as not hungry Vegetable Omlette, Toast Home fries  Lunch: [employer] - meal - ordered out at work  Snack: Cereal/2% milk  Dinner: Rice beans Potatoes, Protein - no vegetable  Snack:  Ice Cream, chips  Beverage intake: water 2-3 bottles, 2% milk, juice 4-6 oz , regular soda 1 can small and coffee 1 cup  Protein supplement: None   Portions:  6 oz Protein  1.5c Starch   Estimated protein intake per day: 80-90 grams  Estimated fluid intake per day: 48 oz water + other beverages  Meals eaten away from home: 5 days at work  Typical meal pattern: 2-3 meals per day and several snacks   Eating Behaviors: Consumption of high calorie/ high fat foods, Consumption of high calorie beverages, Large portion sizes, Mindless eating, Emotional eating, Craves sweet foods and Craves salty foods  Food allergies or intolerances:  None reported  Allergies   Allergen Reactions   • Other Shortness Of Breath     Dog dander   • Wound Dressing Adhesive Hives   • Latex Rash     Cultural or Scientologist considerations: None    Physical Assessment  Physical Activity  Housework etc - sedentary job  Types of exercise:  Walks - no formal exercise - plans to join gym with daughter  Current physical limitations: None    Psychosocial Assessment   Support systems: spouse and daughter     Socioeconomic factors: None  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 (9yo)    Nutrition Diagnosis  Diagnosis: Overweight / Obesity (NC-3.3)  Related to: Physical inactivity and Excessive energy intake  As Evidenced by: BMI >25     Nutrition Prescription: Recommend the following diet  Low fat, Low sugar, High protein and Regular    Interventions and Teaching   Discussed pre-op and post-op nutrition guidelines. Patient educated and handouts provided.   Surgical changes to stomach / GI  Capacity of post-surgery stomach  Diet progression  Adequate hydration  Sugar and fat restriction to decrease "dumping syndrome"  Fat restriction to decrease steatorrhea  Expected weight loss  Weight loss plateaus/ possibility of weight regain  Exercise  Suggestions for pre-op diet  Nutrition considerations after surgery  Protein supplements  Meal planning and preparation  Appropriate carbohydrate, protein, and fat intake, and food/fluid choices to maximize safe weight loss, nutrient intake, and tolerance   Dietary and lifestyle changes  Possible problems with poor eating habits  Intuitive eating  Techniques for self monitoring and keeping daily food journal  Potential for food intolerance after surgery, and ways to deal with them including: lactose intolerance, nausea, reflux, vomiting, diarrhea, food intolerance, appetite changes, gas  Vitamin / Mineral supplementation of Multivitamin with minerals, Calcium, Vitamin B12, Iron and Vitamin D    Patient is not currently pregnant and doesn't desire to become pregnant a minimum of one year post-op    Education provided to: patient    Barriers to learning: No barriers identified    Readiness to change: preparation    Prior research on procedure: internet, discussed with provider and friends or family    Comprehension: verbalizes understanding     Expected Compliance: good    Recommendations  Pt is an appropriate candidate for surgery.  Yes    Evaluation / Monitoring  Dietitian to Monitor: Eating pattern as discussed Tolerance of nutrition prescription Body weight Lab values Physical activity Bowel pattern  Had ablation to control menstrual bleeding - Monitor iron - panel ordered  Goals  Eliminate sugar sweetened beverages, Food journal, Exercise 30 minutes 5 times per week, Complete lession plans 1-6, Eat 3 meals per day and Eliminate mindless snacking   Follow Pre-Surgery guidelines  > Trial [employer] for food logging - downloaded  [employer]    > Establish regular meal pattern - include fruits, vegetables and whole grains  > Avoid skipping meals- Can use protein drink as meal replacement  > Decrease portions  > Focus on protein - include lean protein at each meal and snack - Learn to eat protein first  > Limit processed foods, fast foods and dining away from home  > Include meal prepping  > Limit snacks - healthier choices and portion; avoid grazing  > Slow pace of eating and sip fluids  - practice 30/60 minute rule  > Eliminate by day of surgery ( 1 cup coffee)  > Eliminate carbonation by pre-op diet ( 1 can soda)   > Increase water intake - 64 oz  > Increase physical activity/establish exercise regimen   > Start multi vitamin and additional Vitamin D 2000IU - also rec Vitron C per iron deficiency  Work on skills to cope with emotional eating/mindfull eating  Pre-op weight loss not required but advised not to gain weight  Other (NAFLD cannot be calculated at eval)  F/U next month with bariatric provider      Time Spent:   1 Hour

## 2023-08-23 ENCOUNTER — TELEPHONE (OUTPATIENT)
Dept: OBGYN CLINIC | Facility: CLINIC | Age: 43
End: 2023-08-23

## 2023-08-25 ENCOUNTER — TELEPHONE (OUTPATIENT)
Dept: OBGYN CLINIC | Facility: CLINIC | Age: 43
End: 2023-08-25

## 2023-08-25 ENCOUNTER — ANESTHESIA (OUTPATIENT)
Dept: GASTROENTEROLOGY | Facility: HOSPITAL | Age: 43
End: 2023-08-25

## 2023-08-25 ENCOUNTER — HOSPITAL ENCOUNTER (OUTPATIENT)
Dept: GASTROENTEROLOGY | Facility: HOSPITAL | Age: 43
Setting detail: OUTPATIENT SURGERY
End: 2023-08-25
Attending: SURGERY
Payer: COMMERCIAL

## 2023-08-25 ENCOUNTER — ANESTHESIA EVENT (OUTPATIENT)
Dept: GASTROENTEROLOGY | Facility: HOSPITAL | Age: 43
End: 2023-08-25

## 2023-08-25 VITALS
WEIGHT: 283.73 LBS | HEART RATE: 85 BPM | SYSTOLIC BLOOD PRESSURE: 116 MMHG | DIASTOLIC BLOOD PRESSURE: 67 MMHG | HEIGHT: 67 IN | TEMPERATURE: 97.9 F | BODY MASS INDEX: 44.53 KG/M2 | OXYGEN SATURATION: 96 % | RESPIRATION RATE: 16 BRPM

## 2023-08-25 DIAGNOSIS — N92.0 MENORRHAGIA WITH REGULAR CYCLE: Primary | ICD-10-CM

## 2023-08-25 DIAGNOSIS — E66.01 MORBID OBESITY (HCC): ICD-10-CM

## 2023-08-25 PROCEDURE — 43239 EGD BIOPSY SINGLE/MULTIPLE: CPT | Performed by: SURGERY

## 2023-08-25 PROCEDURE — 88341 IMHCHEM/IMCYTCHM EA ADD ANTB: CPT | Performed by: STUDENT IN AN ORGANIZED HEALTH CARE EDUCATION/TRAINING PROGRAM

## 2023-08-25 PROCEDURE — 88305 TISSUE EXAM BY PATHOLOGIST: CPT | Performed by: STUDENT IN AN ORGANIZED HEALTH CARE EDUCATION/TRAINING PROGRAM

## 2023-08-25 PROCEDURE — 88342 IMHCHEM/IMCYTCHM 1ST ANTB: CPT | Performed by: STUDENT IN AN ORGANIZED HEALTH CARE EDUCATION/TRAINING PROGRAM

## 2023-08-25 RX ORDER — PROPOFOL 10 MG/ML
INJECTION, EMULSION INTRAVENOUS AS NEEDED
Status: DISCONTINUED | OUTPATIENT
Start: 2023-08-25 | End: 2023-08-25

## 2023-08-25 RX ORDER — LIDOCAINE HYDROCHLORIDE 10 MG/ML
INJECTION, SOLUTION EPIDURAL; INFILTRATION; INTRACAUDAL; PERINEURAL AS NEEDED
Status: DISCONTINUED | OUTPATIENT
Start: 2023-08-25 | End: 2023-08-25

## 2023-08-25 RX ORDER — MEDROXYPROGESTERONE ACETATE 10 MG/1
10 TABLET ORAL 2 TIMES DAILY
Qty: 30 TABLET | Refills: 0 | Status: SHIPPED | OUTPATIENT
Start: 2023-08-25

## 2023-08-25 RX ORDER — TRANEXAMIC ACID 650 MG/1
1300 TABLET ORAL 3 TIMES DAILY
Qty: 30 TABLET | Refills: 2 | Status: SHIPPED | OUTPATIENT
Start: 2023-08-25 | End: 2023-08-28

## 2023-08-25 RX ORDER — SODIUM CHLORIDE, SODIUM LACTATE, POTASSIUM CHLORIDE, CALCIUM CHLORIDE 600; 310; 30; 20 MG/100ML; MG/100ML; MG/100ML; MG/100ML
INJECTION, SOLUTION INTRAVENOUS CONTINUOUS PRN
Status: DISCONTINUED | OUTPATIENT
Start: 2023-08-25 | End: 2023-08-25

## 2023-08-25 RX ADMIN — PROPOFOL 150 MG: 10 INJECTION, EMULSION INTRAVENOUS at 07:32

## 2023-08-25 RX ADMIN — PROPOFOL 30 MG: 10 INJECTION, EMULSION INTRAVENOUS at 07:41

## 2023-08-25 RX ADMIN — LIDOCAINE HYDROCHLORIDE 100 MG: 10 INJECTION, SOLUTION EPIDURAL; INFILTRATION; INTRACAUDAL; PERINEURAL at 07:32

## 2023-08-25 RX ADMIN — PROPOFOL 50 MG: 10 INJECTION, EMULSION INTRAVENOUS at 07:37

## 2023-08-25 RX ADMIN — PROPOFOL 50 MG: 10 INJECTION, EMULSION INTRAVENOUS at 07:34

## 2023-08-25 RX ADMIN — SODIUM CHLORIDE, SODIUM LACTATE, POTASSIUM CHLORIDE, AND CALCIUM CHLORIDE: .6; .31; .03; .02 INJECTION, SOLUTION INTRAVENOUS at 07:10

## 2023-08-25 NOTE — H&P
This is a 44 y.o. female with a history of morbid obesity and Body mass index is 44.44 kg/m². Here for an EGD to evaluate the anatomy of the GI tract and to rule out the presence of H. pylori. Physical Exam    /76   Pulse 82   Temp 97.6 °F (36.4 °C) (Temporal)   Resp 16   Ht 5' 7" (1.702 m)   Wt 129 kg (283 lb 11.7 oz)   SpO2 97%   BMI 44.44 kg/m²    AAOx3  RRR  CTA B  Abdomen obese. Benign. A/P:    This is a 44 y.o. female with a history of morbid obesity and Body mass index is 44.44 kg/m². .    Will proceed with the EGD and biopsies.       Casimiro Jung MD  8/25/2023  7:05 AM

## 2023-08-25 NOTE — ANESTHESIA PREPROCEDURE EVALUATION
Procedure:  EGD    Relevant Problems   HEMATOLOGY   (+) Iron deficiency anemia      Other   (+) Menorrhagia with regular cycle        Physical Exam    Airway    Mallampati score: II  TM Distance: >3 FB  Neck ROM: full     Dental   No notable dental hx     Cardiovascular  Rhythm: regular, Rate: normal, Cardiovascular exam normal    Pulmonary  Pulmonary exam normal Breath sounds clear to auscultation,     Other Findings        Anesthesia Plan  ASA Score- 2     Anesthesia Type- IV sedation with anesthesia with ASA Monitors. Additional Monitors:   Airway Plan: NTT. Plan Factors-Exercise tolerance (METS): >4 METS. Chart reviewed. EKG reviewed. Imaging results reviewed. Existing labs reviewed. Patient summary reviewed. Patient is not a current smoker. Obstructive sleep apnea risk education given perioperatively. Induction- intravenous. Postoperative Plan-     Informed Consent-   I personally reviewed this patient with the CRNA. Discussed and agreed on the Anesthesia Plan with the CRNA. Ag Adan

## 2023-08-25 NOTE — ANESTHESIA POSTPROCEDURE EVALUATION
Post-Op Assessment Note    CV Status:  Stable  Pain Score: 0    Pain management: adequate     Mental Status:  Alert and awake   Hydration Status:  Stable   PONV Controlled:  None   Airway Patency:  Patent and adequate      Post Op Vitals Reviewed: Yes      Staff: Anesthesiologist, CRNA         No notable events documented.     /66 (08/25/23 0742)    Temp 97.9 °F (36.6 °C) (08/25/23 0742)    Pulse 95 (08/25/23 0742)   Resp 16 (08/25/23 0742)    SpO2 94 % (08/25/23 0742)

## 2023-08-30 ENCOUNTER — TELEPHONE (OUTPATIENT)
Dept: BARIATRICS | Facility: CLINIC | Age: 43
End: 2023-08-30

## 2023-08-30 ENCOUNTER — HOSPITAL ENCOUNTER (EMERGENCY)
Facility: HOSPITAL | Age: 43
Discharge: HOME | End: 2023-08-31
Attending: EMERGENCY MEDICINE
Payer: COMMERCIAL

## 2023-08-30 ENCOUNTER — NURSE TRIAGE (OUTPATIENT)
Dept: OTHER | Facility: OTHER | Age: 43
End: 2023-08-30

## 2023-08-30 DIAGNOSIS — N93.8 DYSFUNCTIONAL UTERINE BLEEDING: Primary | ICD-10-CM

## 2023-08-30 LAB
ANION GAP SERPL CALCULATED.3IONS-SCNC: 7 MMOL/L
BASOPHILS # BLD AUTO: 0.06 THOUSANDS/ÂΜL (ref 0–0.1)
BASOPHILS NFR BLD AUTO: 1 % (ref 0–1)
BUN SERPL-MCNC: 11 MG/DL (ref 5–25)
CALCIUM SERPL-MCNC: 10 MG/DL (ref 8.4–10.2)
CHLORIDE SERPL-SCNC: 107 MMOL/L (ref 96–108)
CO2 SERPL-SCNC: 22 MMOL/L (ref 21–32)
CREAT SERPL-MCNC: 0.58 MG/DL (ref 0.6–1.3)
EOSINOPHIL # BLD AUTO: 0.57 THOUSAND/ÂΜL (ref 0–0.61)
EOSINOPHIL NFR BLD AUTO: 5 % (ref 0–6)
ERYTHROCYTE [DISTWIDTH] IN BLOOD BY AUTOMATED COUNT: 14.6 % (ref 11.6–15.1)
GFR SERPL CREATININE-BSD FRML MDRD: 114 ML/MIN/1.73SQ M
GLUCOSE SERPL-MCNC: 119 MG/DL (ref 65–140)
HCG SERPL QL: NEGATIVE
HCT VFR BLD AUTO: 32.8 % (ref 34.8–46.1)
HGB BLD-MCNC: 10.1 G/DL (ref 11.5–15.4)
IMM GRANULOCYTES # BLD AUTO: 0.04 THOUSAND/UL (ref 0–0.2)
IMM GRANULOCYTES NFR BLD AUTO: 0 % (ref 0–2)
LYMPHOCYTES # BLD AUTO: 2.96 THOUSANDS/ÂΜL (ref 0.6–4.47)
LYMPHOCYTES NFR BLD AUTO: 26 % (ref 14–44)
MCH RBC QN AUTO: 27 PG (ref 26.8–34.3)
MCHC RBC AUTO-ENTMCNC: 30.8 G/DL (ref 31.4–37.4)
MCV RBC AUTO: 88 FL (ref 82–98)
MONOCYTES # BLD AUTO: 0.62 THOUSAND/ÂΜL (ref 0.17–1.22)
MONOCYTES NFR BLD AUTO: 5 % (ref 4–12)
NEUTROPHILS # BLD AUTO: 7.33 THOUSANDS/ÂΜL (ref 1.85–7.62)
NEUTS SEG NFR BLD AUTO: 63 % (ref 43–75)
NRBC BLD AUTO-RTO: 0 /100 WBCS
PLATELET # BLD AUTO: 483 THOUSANDS/UL (ref 149–390)
PMV BLD AUTO: 9 FL (ref 8.9–12.7)
POTASSIUM SERPL-SCNC: 3.6 MMOL/L (ref 3.5–5.3)
RBC # BLD AUTO: 3.74 MILLION/UL (ref 3.81–5.12)
SODIUM SERPL-SCNC: 136 MMOL/L (ref 135–147)
WBC # BLD AUTO: 11.58 THOUSAND/UL (ref 4.31–10.16)

## 2023-08-30 PROCEDURE — 84703 CHORIONIC GONADOTROPIN ASSAY: CPT | Performed by: EMERGENCY MEDICINE

## 2023-08-30 PROCEDURE — 88305 TISSUE EXAM BY PATHOLOGIST: CPT | Performed by: STUDENT IN AN ORGANIZED HEALTH CARE EDUCATION/TRAINING PROGRAM

## 2023-08-30 PROCEDURE — 99284 EMERGENCY DEPT VISIT MOD MDM: CPT

## 2023-08-30 PROCEDURE — 96374 THER/PROPH/DIAG INJ IV PUSH: CPT

## 2023-08-30 PROCEDURE — 88342 IMHCHEM/IMCYTCHM 1ST ANTB: CPT | Performed by: STUDENT IN AN ORGANIZED HEALTH CARE EDUCATION/TRAINING PROGRAM

## 2023-08-30 PROCEDURE — 85025 COMPLETE CBC W/AUTO DIFF WBC: CPT | Performed by: EMERGENCY MEDICINE

## 2023-08-30 PROCEDURE — 80048 BASIC METABOLIC PNL TOTAL CA: CPT | Performed by: EMERGENCY MEDICINE

## 2023-08-30 PROCEDURE — 36415 COLL VENOUS BLD VENIPUNCTURE: CPT | Performed by: EMERGENCY MEDICINE

## 2023-08-30 PROCEDURE — 96361 HYDRATE IV INFUSION ADD-ON: CPT

## 2023-08-30 PROCEDURE — 88341 IMHCHEM/IMCYTCHM EA ADD ANTB: CPT | Performed by: STUDENT IN AN ORGANIZED HEALTH CARE EDUCATION/TRAINING PROGRAM

## 2023-08-30 RX ORDER — KETOROLAC TROMETHAMINE 30 MG/ML
15 INJECTION, SOLUTION INTRAMUSCULAR; INTRAVENOUS ONCE
Status: COMPLETED | OUTPATIENT
Start: 2023-08-30 | End: 2023-08-30

## 2023-08-30 RX ADMIN — KETOROLAC TROMETHAMINE 15 MG: 30 INJECTION, SOLUTION INTRAMUSCULAR at 23:22

## 2023-08-30 RX ADMIN — SODIUM CHLORIDE 1000 ML: 0.9 INJECTION, SOLUTION INTRAVENOUS at 22:24

## 2023-08-30 NOTE — TELEPHONE ENCOUNTER
----- Message from Andrea Anne PA-C sent at 8/30/2023  9:42 AM EDT -----  Please advise patient that pathology is benign and shows no H. Pylori bacteria.  Thank you

## 2023-08-31 VITALS
RESPIRATION RATE: 16 BRPM | SYSTOLIC BLOOD PRESSURE: 136 MMHG | HEART RATE: 89 BPM | OXYGEN SATURATION: 96 % | TEMPERATURE: 97.4 F | DIASTOLIC BLOOD PRESSURE: 80 MMHG

## 2023-08-31 PROCEDURE — 96375 TX/PRO/DX INJ NEW DRUG ADDON: CPT

## 2023-08-31 PROCEDURE — 99285 EMERGENCY DEPT VISIT HI MDM: CPT | Performed by: EMERGENCY MEDICINE

## 2023-08-31 RX ORDER — TRANEXAMIC ACID 650 MG/1
1300 TABLET ORAL 3 TIMES DAILY PRN
Qty: 15 TABLET | Refills: 0 | Status: SHIPPED | OUTPATIENT
Start: 2023-08-31 | End: 2023-09-05

## 2023-08-31 RX ORDER — TRANEXAMIC ACID 10 MG/ML
1000 INJECTION, SOLUTION INTRAVENOUS ONCE
Status: COMPLETED | OUTPATIENT
Start: 2023-08-31 | End: 2023-08-31

## 2023-08-31 RX ADMIN — TRANEXAMIC ACID 1000 MG: 10 INJECTION, SOLUTION INTRAVENOUS at 00:28

## 2023-08-31 NOTE — TELEPHONE ENCOUNTER
Reason for Disposition  • SEVERE vaginal bleeding (e.g., soaking 2 pads or tampons per hour and present 2 or more hours; 1 menstrual cup every 2 hours)    Answer Assessment - Initial Assessment Questions  1. AMOUNT: "Describe the bleeding that you are having."     - SPOTTING: spotting, or pinkish / brownish mucous discharge; does not fill panti-liner or pad     - MILD:  less than 1 pad / hour; less than patient's usual menstrual bleeding    - MODERATE: 1-2 pads / hour; 1 menstrual cup every 6 hours; small-medium blood clots (e.g., pea, grape, small coin)    - SEVERE: soaking 2 or more pads/hour for 2 or more hours; 1 menstrual cup every 2 hours; bleeding not contained by pads or continuous red blood from vagina; large blood clots (e.g., golf ball, large coin)       heavy bleeding for 2 days and at 1700 it became even heavier with "huge" blood clots   2. ONSET: "When did the bleeding begin?" "Is it continuing now?"      Started 2 days ago  3. MENSTRUAL PERIOD: "When was the last normal menstrual period?" "How is this different than your period?"      Started 2 days ago but I am using huge pads and I have to change every hour   4. REGULARITY: "How regular are your periods?"      unknown  5. ABDOMINAL PAIN: "Do you have any pain?" "How bad is the pain?"  (e.g., Scale 1-10; mild, moderate, or severe)    - MILD (1-3): doesn't interfere with normal activities, abdomen soft and not tender to touch     - MODERATE (4-7): interferes with normal activities or awakens from sleep, tender to touch     - SEVERE (8-10): excruciating pain, doubled over, unable to do any normal activities       Severe cramping rated "5"  6. PREGNANCY: "Could you be pregnant?" "Are you sexually active?" "Did you recently give birth?"      No chance of pregnancy, I had an ablation   7. BREASTFEEDING: "Are you breastfeeding?"      no  8.  HORMONES: "Are you taking any hormone medications, prescription or OTC?" (e.g., birth control pills, estrogen) no  9. BLOOD THINNERS: "Do you take any blood thinners?" (e.g., Coumadin/warfarin, Pradaxa/dabigatran, aspirin)      No blood thinners. DOes take Motrin  10. CAUSE: "What do you think is causing the bleeding?" (e.g., recent gyn surgery, recent gyn procedure; known bleeding disorder, cervical cancer, polycystic ovarian disease, fibroids)          I don't know   11. HEMODYNAMIC STATUS: "Are you weak or feeling lightheaded?" If Yes, ask: "Can you stand and walk normally?"         Not lightheaded but feels SOB   12.  OTHER SYMPTOMS: "What other symptoms are you having with the bleeding?" (e.g., passed tissue, vaginal discharge, fever, menstrual-type cramps)       Cramping and clots bigger than my hand    Protocols used: VAGINAL BLEEDING - ABNORMAL-ADULT-

## 2023-08-31 NOTE — ED PROVIDER NOTES
History  Chief Complaint   Patient presents with   • Vaginal Bleeding     Pt c/o of heavy bleeding. Pt had an ablasion in June and first menstrual cycle since the procedure was on 8/4. Pt states she has been bleeding since. 45 y/o female presents to the ED for vaginal bleeding x 4 weeks. Patient states that she had an ablation done in June by her OB/GYN for dysfunctional uterine bleeding. States that she did not have any bleeding until 8/4. States that it she has been bleeding heavy since. She reports that she goes through a pad every 30 minutes to 1 hour. Also states that today she developed large fist sized clots. She also states that she has intermittent lower abdominal pain. She called her OB/GYN last week and was prescribed Provera and TXA. States that she was unable to get the TXA because her pharmacy did not have it. She states that she has been taking the Provera over the last 5 days without any improvement. She denies any fever/chills, chest pain, shortness of breath, lightheadedness, nausea/vomiting, or diarrhea/constipation. No other complaints. Denies thinners. History provided by:  Patient  Vaginal Bleeding  Quality:  Bright red, heavier than menses and clots  Severity:  Moderate  Onset quality:  Sudden  Timing:  Constant  Progression:  Worsening  Chronicity:  New  Number of pads used:  1 every 30 mins to 1 hour  Context: spontaneously    Relieved by:  None tried  Worsened by:  Nothing  Ineffective treatments:  None tried  Associated symptoms: abdominal pain    Associated symptoms: no dysuria, no fever and no nausea        Prior to Admission Medications   Prescriptions Last Dose Informant Patient Reported?  Taking?   ibuprofen (MOTRIN) 600 mg tablet  Self Yes No   Sig: Take by mouth every 6 (six) hours as needed for mild pain   lidocaine (LIDODERM) 5 %  Self No No   Sig: Apply 1 patch topically daily Remove & Discard patch within 12 hours or as directed by MD   medroxyPROGESTERone (PROVERA) 10 mg tablet   No No   Sig: Take 1 tablet (10 mg total) by mouth 2 (two) times a day      Facility-Administered Medications: None       Past Medical History:   Diagnosis Date   • Anemia    • CTS (carpal tunnel syndrome)    • Hemorrhoids    • History of transfusion     post partum       Past Surgical History:   Procedure Laterality Date   • CARPAL TUNNEL RELEASE     •  SECTION     • TX HYSTEROSCOPY BX ENDOMETRIUM&/POLYPC W/WO D&C N/A 2023    Procedure: DILATATION AND CURETTAGE (D&C) WITH HYSTEROSCOPY;  Surgeon: Sacha Olmos MD;  Location: [facility];  Service: Gynecology   • TX HYSTEROSCOPY ENDOMETRIAL ABLATION N/A 2023    Procedure: Esa Ruvalcaba;  Surgeon: Sacha Olmos MD;  Location: [facility];  Service: Gynecology   • TUBAL LIGATION         Family History   Problem Relation Age of Onset   • Breast cancer Neg Hx    • Colon cancer Neg Hx    • Ovarian cancer Neg Hx    • Uterine cancer Neg Hx    • Cervical cancer Neg Hx      I have reviewed and agree with the history as documented. E-Cigarette/Vaping   • E-Cigarette Use Never User      E-Cigarette/Vaping Substances   • Nicotine No    • THC No    • CBD No    • Flavoring No    • Other No    • Unknown No      Social History     Tobacco Use   • Smoking status: Never   • Smokeless tobacco: Never   Vaping Use   • Vaping Use: Never used   Substance Use Topics   • Alcohol use: No   • Drug use: No       Review of Systems   Constitutional: Negative for chills and fever. HENT: Negative for congestion, ear pain and sore throat. Eyes: Negative for pain and visual disturbance. Respiratory: Negative for cough, shortness of breath and wheezing. Cardiovascular: Negative for chest pain and leg swelling. Gastrointestinal: Positive for abdominal pain. Negative for diarrhea, nausea and vomiting. Genitourinary: Positive for vaginal bleeding. Negative for dysuria, frequency, hematuria and urgency. Musculoskeletal: Negative for neck pain and neck stiffness. Skin: Negative for rash and wound. Neurological: Negative for weakness, numbness and headaches. Psychiatric/Behavioral: Negative for agitation and confusion. All other systems reviewed and are negative. Physical Exam  Physical Exam  Vitals and nursing note reviewed. Constitutional:       Appearance: She is well-developed. HENT:      Head: Normocephalic and atraumatic. Eyes:      Pupils: Pupils are equal, round, and reactive to light. Cardiovascular:      Rate and Rhythm: Normal rate and regular rhythm. Pulmonary:      Effort: Pulmonary effort is normal.      Breath sounds: Normal breath sounds. Abdominal:      General: Bowel sounds are normal.      Palpations: Abdomen is soft. Tenderness: There is no abdominal tenderness. Musculoskeletal:         General: Normal range of motion. Cervical back: Normal range of motion and neck supple. Skin:     General: Skin is warm and dry. Neurological:      General: No focal deficit present. Mental Status: She is alert and oriented to person, place, and time.       Comments: No focal deficits         Vital Signs  ED Triage Vitals [08/30/23 2114]   Temperature Pulse Respirations Blood Pressure SpO2   (!) 97.4 °F (36.3 °C) 89 18 130/72 98 %      Temp src Heart Rate Source Patient Position - Orthostatic VS BP Location FiO2 (%)   -- Monitor Sitting Right arm --      Pain Score       No Pain           Vitals:    08/30/23 2114 08/31/23 0000   BP: 130/72 128/76   Pulse: 89 71   Patient Position - Orthostatic VS: Sitting Lying         Visual Acuity      ED Medications  Medications   sodium chloride 0.9 % bolus 1,000 mL (1,000 mL Intravenous New Bag 8/30/23 2224)   ketorolac (TORADOL) injection 15 mg (15 mg Intravenous Given 8/30/23 2322)   Tranexamic Acid-NaCl (CYKLOKAPRON) 1000-0.7 MG/100ML-% injection 1,000 mg (1,000 mg Intravenous New Bag 8/31/23 0028)       Diagnostic Studies  Results Reviewed     Procedure Component Value Units Date/Time    hCG, qualitative pregnancy [[id number]]  (Normal) Collected: 08/30/23 2223    Lab Status: Final result Specimen: Blood from Hand, Right Updated: 08/30/23 2256     Preg, Serum Negative    Basic metabolic panel [[id number]]  (Abnormal) Collected: 08/30/23 2223    Lab Status: Final result Specimen: Blood from Hand, Right Updated: 08/30/23 2250     Sodium 136 mmol/L      Potassium 3.6 mmol/L      Chloride 107 mmol/L      CO2 22 mmol/L      ANION GAP 7 mmol/L      BUN 11 mg/dL      Creatinine 0.58 mg/dL      Glucose 119 mg/dL      Calcium 10.0 mg/dL      eGFR 114 ml/min/1.73sq m     Narrative:      [place] guidelines for Chronic Kidney Disease (CKD):   •  Stage 1 with normal or high GFR (GFR > 90 mL/min/1.73 square meters)  •  Stage 2 Mild CKD (GFR = 60-89 mL/min/1.73 square meters)  •  Stage 3A Moderate CKD (GFR = 45-59 mL/min/1.73 square meters)  •  Stage 3B Moderate CKD (GFR = 30-44 mL/min/1.73 square meters)  •  Stage 4 Severe CKD (GFR = 15-29 mL/min/1.73 square meters)  •  Stage 5 End Stage CKD (GFR <15 mL/min/1.73 square meters)  Note: GFR calculation is accurate only with a steady state creatinine    CBC and differential [[id number]]  (Abnormal) Collected: 08/30/23 2223    Lab Status: Final result Specimen: Blood from Hand, Right Updated: 08/30/23 2238     WBC 11.58 Thousand/uL      RBC 3.74 Million/uL      Hemoglobin 10.1 g/dL      Hematocrit 32.8 %      MCV 88 fL      MCH 27.0 pg      MCHC 30.8 g/dL      RDW 14.6 %      MPV 9.0 fL      Platelets 383 Thousands/uL      nRBC 0 /100 WBCs      Neutrophils Relative 63 %      Immat GRANS % 0 %      Lymphocytes Relative 26 %      Monocytes Relative 5 %      Eosinophils Relative 5 %      Basophils Relative 1 %      Neutrophils Absolute 7.33 Thousands/µL      Immature Grans Absolute 0.04 Thousand/uL      Lymphocytes Absolute 2.96 Thousands/µL      Monocytes Absolute 0.62 Thousand/µL      Eosinophils Absolute 0.57 Thousand/µL      Basophils Absolute 0.06 Thousands/µL     UA w Reflex to Microscopic w Reflex to Culture [[id number]]     Lab Status: No result Specimen: Urine                  No orders to display              Procedures  Procedures         ED Course  ED Course as of 08/31/23 0048   Wed Aug 30, 2023   2151 Ablation in June with ricky jackson- didn't bleed until 8/4- has been bleeding since. 1 pad lasts 30mins to 1 hour  with large clots - size of fists today. Called obgyn and given provera 10 mg bid daily 15 days since sat. Didn't have TXA at [facility]. Has had to come to the ED in the past for this and usually get txa. Has had intermittent cramping with the clots. 2347 Spoke with Dr Brayton Canavan, recommends TXA IV here and oral txa script. Recommends outpatient follow up with her obgyn                                SBIRT 20yo+    Flowsheet Row Most Recent Value   Initial Alcohol Screen: US AUDIT-C     1. How often do you have a drink containing alcohol? 0 Filed at: 08/30/2023 2201   2. How many drinks containing alcohol do you have on a typical day you are drinking? 0 Filed at: 08/30/2023 2201   3b. FEMALE Any Age, or MALE 65+: How often do you have 4 or more drinks on one occassion? 0 Filed at: 08/30/2023 2201   Audit-C Score 0 Filed at: 08/30/2023 2201   EUFEMIA: How many times in the past year have you. .. Used an illegal drug or used a prescription medication for non-medical reasons? Never Filed at: 08/30/2023 2201                    Medical Decision Making  60-year-old female with vaginal bleeding-we will get labs to r/o anemia and speak with obgyn for recommendations. Spoke with Dr Brayton Canavan from OB/GYN who agrees with plan for TXA IV here and prescription for oral TXA. Recommends outpatient follow-up. Dysfunctional uterine bleeding: acute illness or injury  Amount and/or Complexity of Data Reviewed  Labs: ordered.       Risk  Prescription drug management. Disposition  Final diagnoses:   Dysfunctional uterine bleeding     Time reflects when diagnosis was documented in both MDM as applicable and the Disposition within this note     Time User Action Codes Description Comment    8/31/2023 12:06 AM Maura Webb [N93.8] Dysfunctional uterine bleeding       ED Disposition     ED Disposition   Discharge    Condition   Stable    Date/Time   Thu Aug 31, 2023 12:06 AM    Comment   [place]a Grande discharge to home/self care. Follow-up Information     Follow up With Specialties Details Why Contact Info Additional Information    Leana Leary MD Obstetrics and Gynecology, Obstetrics, Gynecology Call in 1 day For follow-up as soon as possible [address] Emergency Department Emergency Medicine Go to  Immediately for any new or worsening symptoms [address] Emergency Department, [place], [place], [address]          Patient's Medications   Discharge Prescriptions    TRANEXAMIC ACID 650 MG TABS    Take 2 tablets (1,300 mg total) by mouth 3 (three) times a day as needed (for vaginal bleeding) for up to 5 days       Start Date: 8/31/2023 End Date: 9/5/2023       Order Dose: 1,300 mg       Quantity: 15 tablet    Refills: 0       No discharge procedures on file.     PDMP Review       Value Time User    PDMP Reviewed  Yes 6/23/2023  1:36 PM Leana Leary MD          ED Provider  Electronically Signed by           Maura Low DO  08/31/23 9468

## 2023-08-31 NOTE — TELEPHONE ENCOUNTER
Pt called stating for the past 2 days she has had extremely heavy menstrual bleeding, using at least 1 pad per hour and having overflows. Pt states she is now passing clots as big as her hand and she is feeling SOB. Advice offered per protocol and pt will go to [facility] ER now.

## 2023-08-31 NOTE — TELEPHONE ENCOUNTER
Regarding: blood clots/ heavy blood flow  ----- Message from Ventura Srivastava sent at 8/30/2023  8:09 PM EDT -----  Pt called, " I had surgery 1 month ago and I had my first menstrual. I am bleeding so much and I recently released 2 big blood clots that are bigger than a fist. I am also experiencing a lot of cramping.  I am scared and do not know what to do."

## 2023-09-05 ENCOUNTER — TELEPHONE (OUTPATIENT)
Dept: OBGYN CLINIC | Facility: CLINIC | Age: 43
End: 2023-09-05

## 2023-09-05 NOTE — TELEPHONE ENCOUNTER
Patient is calling again to inform us that her bleeding has been continual since her ED visit. Currently she is up to 2-3 pads an hour. Patient is looking to come in and possibly discuss removal of her uterus. Please advise.

## 2023-09-05 NOTE — TELEPHONE ENCOUNTER
Left message for patient to call back  Seen in emergency room 8/31 for dub.  Had ablation 6/2023  Prescription tranexamic acid times 5 days

## 2023-09-08 ENCOUNTER — TELEPHONE (OUTPATIENT)
Dept: BARIATRICS | Facility: CLINIC | Age: 43
End: 2023-09-08

## 2023-09-08 NOTE — TELEPHONE ENCOUNTER
LMOM FOR PT TO RETURN CALL IF WE DO HEAR FROM HER BY 09/14/2023. I TOLD HER THAT WE WILL BE HALTING HER ON 09/15/2023.

## 2023-09-18 ENCOUNTER — TELEPHONE (OUTPATIENT)
Dept: BARIATRICS | Facility: CLINIC | Age: 43
End: 2023-09-18

## 2023-09-22 ENCOUNTER — TELEPHONE (OUTPATIENT)
Dept: BARIATRICS | Facility: CLINIC | Age: 43
End: 2023-09-22

## 2023-09-22 NOTE — TELEPHONE ENCOUNTER
Patient called, she received a letter that she was "halted" due to missing appointments. She wants to know how she can resolve this matter and be restarted. Please advise patient.

## 2023-09-25 ENCOUNTER — TELEPHONE (OUTPATIENT)
Dept: BARIATRICS | Facility: CLINIC | Age: 43
End: 2023-09-25

## 2023-09-25 NOTE — TELEPHONE ENCOUNTER
LMOM FOR PT THAT IF SHE WOULD LIKE TO RESTART OUR PROGRAM . SHE JUST HAS TO CALL AND SCHEDULE A EVAL AND RESTART.

## 2023-09-27 ENCOUNTER — TELEPHONE (OUTPATIENT)
Dept: BARIATRICS | Facility: CLINIC | Age: 43
End: 2023-09-27

## 2023-09-27 ENCOUNTER — OFFICE VISIT (OUTPATIENT)
Dept: OBGYN CLINIC | Facility: CLINIC | Age: 43
End: 2023-09-27
Payer: COMMERCIAL

## 2023-09-27 VITALS
HEIGHT: 67 IN | WEIGHT: 286.2 LBS | DIASTOLIC BLOOD PRESSURE: 70 MMHG | SYSTOLIC BLOOD PRESSURE: 140 MMHG | BODY MASS INDEX: 44.92 KG/M2

## 2023-09-27 DIAGNOSIS — N92.1 MENORRHAGIA WITH IRREGULAR CYCLE: Primary | ICD-10-CM

## 2023-09-27 PROCEDURE — 99214 OFFICE O/P EST MOD 30 MIN: CPT | Performed by: OBSTETRICS & GYNECOLOGY

## 2023-09-27 NOTE — TELEPHONE ENCOUNTER
PT/PTA met face to face to discuss pt's treatment plan and progress towards established goals. Pt will be seen by a physical therapist minimally every 6th visit or every 30 days.    Maria Sebastian PTA     Spoke with patient. Due to serious health complications she  Had stopped coming in for surgical preop visits. However, she wants us to be aware that she is going to be having some surgery to correct any issues and will be wanting to restart the program once she is cleared to do so.

## 2023-09-27 NOTE — PROGRESS NOTES
Assessment/Plan:    Menorrhagia with irregular cycle  S/p ablation, bleeding has returned- heavier than prior. Request for definitive treatment    Plan for Total laparoscopic hysterectomy with bilateral salpingectomy    We reviewed the risks of the surgery including but not limited to infection, bleeding, injury to nearby organs (bowel. bladder, ureter, blood vessel, nerve) and possible long term consequences of such injury, as well as possibility of oopherectomy, conversion to laparotomy, need for blood transfusion and other resuscitative measures. Treatment options for menorrhagia discussed:    Observation with treatment of anemia, optimize nutrition  Medications: hormones ( pill, IUD, injection, ring, patch, implant, combined and progestin only) and TXA  Surgery: D&C, Hysteroscopy, Ablation, Hysterectomy           Diagnoses and all orders for this visit:    Menorrhagia with irregular cycle          Subjective:      Patient ID: Corry Barragan is a 44 y.o. female. Patient here for 1 month follow up from ED. Seen for AUB and pelvic pain. Had ablation 23. No bleeding until -. Prescribed provera, did not work at all. Patient is "traumitized" by this experience. Emotional currently. Feels very weak and lethargic. 44yo  0 1 3 with return of menorrhagia. Ablation completed in 2023 and had about 6 weeks with no bleed then large, heavy gushing. Very traumatic. Wants definitive treatment. Plan for hysterectomy. Gynecologic Exam  She complains of vaginal bleeding. She reports no genital itching, genital lesions, genital odor, genital rash, pelvic pain or vaginal discharge. This is a new problem. The current episode started more than 1 month ago. The problem occurs intermittently. The problem has been gradually worsening since onset. The patient is experiencing no pain.  Pertinent negatives include no abdominal pain, back pain, chills, constipation, diarrhea, dysuria, fever, flank pain, frequency, headaches, hematuria, nausea, painful intercourse, rash, sore throat, urgency or vomiting. The vaginal bleeding is heavier than menses. Patient has been passing clots. Patient has not been passing tissue. The symptoms are aggravated by activity, tactile pressure and heavy lifting. Past treatments include NSAIDs. The treatment provided no relief. She is sexually active. The patient's menstrual history has been regular. The following portions of the patient's history were reviewed and updated as appropriate: She  has a past medical history of Anemia, CTS (carpal tunnel syndrome), Hemorrhoids, and History of transfusion. She   Patient Active Problem List    Diagnosis Date Noted   • Menorrhagia with irregular cycle 2023   • Morbid (severe) obesity due to excess calories ([address]) 2023   • Obesity, Class III, BMI 40-49.9 (morbid obesity) ([address]) 2023   • S/P endometrial ablation 2023   • Ovarian cyst, right 2023   • Thrombocytosis 2021   • Iron deficiency anemia 2021     She  has a past surgical history that includes  section; Carpal tunnel release; Tubal ligation; pr hysteroscopy bx endometrium&/polypc w/wo d&c (N/A, 2023); and pr hysteroscopy endometrial ablation (N/A, 2023). Her family history is not on file. She  reports that she has never smoked. She has never used smokeless tobacco. She reports that she does not drink alcohol and does not use drugs. Current Outpatient Medications   Medication Sig Dispense Refill   • ibuprofen (MOTRIN) 600 mg tablet Take by mouth every 6 (six) hours as needed for mild pain     • lidocaine (LIDODERM) 5 % Apply 1 patch topically daily Remove & Discard patch within 12 hours or as directed by MD 30 patch 0   • medroxyPROGESTERone (PROVERA) 10 mg tablet Take 1 tablet (10 mg total) by mouth 2 (two) times a day 30 tablet 0     No current facility-administered medications for this visit.      Current Outpatient Medications on File Prior to Visit   Medication Sig   • ibuprofen (MOTRIN) 600 mg tablet Take by mouth every 6 (six) hours as needed for mild pain   • lidocaine (LIDODERM) 5 % Apply 1 patch topically daily Remove & Discard patch within 12 hours or as directed by MD   • medroxyPROGESTERone (PROVERA) 10 mg tablet Take 1 tablet (10 mg total) by mouth 2 (two) times a day     No current facility-administered medications on file prior to visit. She is allergic to other, wound dressing adhesive, and latex. .    Review of Systems   Constitutional: Positive for fatigue. Negative for activity change, appetite change, chills and fever. HENT: Negative for rhinorrhea, sneezing and sore throat. Eyes: Negative for visual disturbance. Respiratory: Negative for cough, shortness of breath and wheezing. Cardiovascular: Negative for chest pain, palpitations and leg swelling. Gastrointestinal: Negative for abdominal distention, abdominal pain, constipation, diarrhea, nausea and vomiting. Genitourinary: Positive for menstrual problem and vaginal bleeding. Negative for difficulty urinating, dysuria, flank pain, frequency, hematuria, pelvic pain, urgency and vaginal discharge. Musculoskeletal: Negative for back pain. Skin: Negative for rash. Neurological: Negative for syncope, light-headedness and headaches. Objective:      /70 (BP Location: Left arm, Patient Position: Sitting, Cuff Size: Standard)   Ht 5' 7" (1.702 m)   Wt 130 kg (286 lb 3.2 oz)   BMI 44.83 kg/m²          Physical Exam  Constitutional:       General: She is not in acute distress. Appearance: She is well-developed. She is not diaphoretic. Neck:      Vascular: No JVD. Cardiovascular:      Rate and Rhythm: Normal rate and regular rhythm. Heart sounds: Normal heart sounds. No murmur heard. No friction rub. No gallop. Pulmonary:      Effort: Pulmonary effort is normal. No respiratory distress.       Breath sounds: Normal breath sounds. Abdominal:      General: Bowel sounds are normal. There is no distension. Palpations: Abdomen is soft. Tenderness: There is no abdominal tenderness. There is no guarding or rebound. Genitourinary:     Labia:         Right: No rash, tenderness or lesion. Left: No rash, tenderness or lesion. Vagina: Normal. No vaginal discharge, erythema or tenderness. Cervix: No cervical motion tenderness, discharge or friability. Uterus: Not deviated, not enlarged, not fixed and not tender. Adnexa:         Right: No mass, tenderness or fullness. Left: No mass, tenderness or fullness. Musculoskeletal:      Cervical back: Neck supple. Right lower leg: No edema. Left lower leg: No edema. Lymphadenopathy:      Cervical: No cervical adenopathy. Neurological:      Mental Status: She is alert and oriented to person, place, and time. Deep Tendon Reflexes: Reflexes are normal and symmetric. I have spent a total time of 30 minutes on 09/29/23 in caring for this patient including Prognosis, Risks and benefits of tx options, Instructions for management, Impressions, Counseling / Coordination of care, Documenting in the medical record and Reviewing / ordering tests, medicine, procedures  .

## 2023-09-29 ENCOUNTER — TELEPHONE (OUTPATIENT)
Dept: OBGYN CLINIC | Facility: CLINIC | Age: 43
End: 2023-09-29

## 2023-09-29 PROBLEM — N92.0 MENORRHAGIA WITH REGULAR CYCLE: Status: RESOLVED | Noted: 2023-04-25 | Resolved: 2023-09-29

## 2023-09-29 PROBLEM — R93.89 ENDOMETRIAL THICKENING ON ULTRASOUND: Status: RESOLVED | Noted: 2023-05-23 | Resolved: 2023-09-29

## 2023-09-29 PROBLEM — N92.1 MENORRHAGIA WITH IRREGULAR CYCLE: Status: ACTIVE | Noted: 2023-09-29

## 2023-09-29 NOTE — TELEPHONE ENCOUNTER
[place] cell phone for patient to call back to schedule her surgical procedure with Dr. Chloe Gardner

## 2023-09-29 NOTE — ASSESSMENT & PLAN NOTE
S/p ablation, bleeding has returned- heavier than prior. Request for definitive treatment    Plan for Total laparoscopic hysterectomy with bilateral salpingectomy    We reviewed the risks of the surgery including but not limited to infection, bleeding, injury to nearby organs (bowel. bladder, ureter, blood vessel, nerve) and possible long term consequences of such injury, as well as possibility of oopherectomy, conversion to laparotomy, need for blood transfusion and other resuscitative measures.        Treatment options for menorrhagia discussed:    Observation with treatment of anemia, optimize nutrition  Medications: hormones ( pill, IUD, injection, ring, patch, implant, combined and progestin only) and TXA  Surgery: D&C, Hysteroscopy, Ablation, Hysterectomy

## 2023-10-18 NOTE — PRE-PROCEDURE INSTRUCTIONS
No outpatient medications have been marked as taking for the 10/27/23 encounter [facility] HOSPITAL Encounter). Per pt she does not take any medications or supplements. Pt instructed to stop nsaids and supplements one week prior to surgery. Pt verbalized understanding of shower and med instructions. Medication instructions for day surgery reviewed. Please use only a sip of water to take your instructed medications. Avoid all over the counter vitamins, supplements and NSAIDS for one week prior to surgery per anesthesia guidelines. Tylenol is ok to take as needed. You will receive a call one business day prior to surgery with an arrival time and hospital directions. If your surgery is scheduled on a Monday, the hospital will be calling you on the Friday prior to your surgery. If you have not heard from anyone by 8pm, please call the hospital supervisor through the hospital  at [phone]. Lillian Severo [phone]). Do not eat or drink anything after midnight the night before your surgery, including candy, mints, lifesavers, or chewing gum. Do not drink alcohol 24hrs before your surgery. Try not to smoke at least 24hrs before your surgery. Follow the pre surgery showering instructions as listed in the [facility] Surgical Experience Booklet” or otherwise provided by your surgeon's office. Do not shave the surgical area 24 hours before surgery. Do not apply any lotions, creams, including makeup, cologne, deodorant, or perfumes after showering on the day of your surgery. No contact lenses, eye make-up, or artificial eyelashes. Remove nail polish, including gel polish, and any artificial, gel, or acrylic nails if possible. Remove all jewelry including rings and body piercing jewelry. Wear causal clothing that is easy to take on and off. Consider your type of surgery. Keep any valuables, jewelry, piercings at home. Please bring any specially ordered equipment (sling, braces) if indicated.     Arrange for a responsible person to drive you to and from the hospital on the day of your surgery. Visitor Guidelines discussed. Call the surgeon's office with any new illnesses, exposures, or additional questions prior to surgery. Please reference your [facility] Surgical Experience Booklet” for additional information to prepare for your upcoming surgery.

## 2023-10-19 ENCOUNTER — APPOINTMENT (OUTPATIENT)
Dept: LAB | Facility: HOSPITAL | Age: 43
End: 2023-10-19
Attending: OBSTETRICS & GYNECOLOGY
Payer: COMMERCIAL

## 2023-10-19 DIAGNOSIS — Z01.818 PRE-OP TESTING: ICD-10-CM

## 2023-10-19 DIAGNOSIS — Z01.818 PRE-OP EXAMINATION: ICD-10-CM

## 2023-10-19 LAB
ABO GROUP BLD: NORMAL
BLD GP AB SCN SERPL QL: NEGATIVE
RH BLD: POSITIVE
SPECIMEN EXPIRATION DATE: NORMAL

## 2023-10-19 PROCEDURE — 86901 BLOOD TYPING SEROLOGIC RH(D): CPT

## 2023-10-19 PROCEDURE — 86850 RBC ANTIBODY SCREEN: CPT

## 2023-10-19 PROCEDURE — 86900 BLOOD TYPING SEROLOGIC ABO: CPT

## 2023-10-25 PROCEDURE — NC001 PR NO CHARGE: Performed by: OBSTETRICS & GYNECOLOGY

## 2023-10-25 NOTE — H&P
Assessment/Plan:    Menorrhagia with irregular cycle  S/p ablation, bleeding has returned- heavier than prior. Request for definitive treatment    Plan for Total laparoscopic hysterectomy with bilateral salpingectomy    We reviewed the risks of the surgery including but not limited to infection, bleeding, injury to nearby organs (bowel. bladder, ureter, blood vessel, nerve) and possible long term consequences of such injury, as well as possibility of oopherectomy, conversion to laparotomy, need for blood transfusion and other resuscitative measures. Treatment options for menorrhagia discussed:    Observation with treatment of anemia, optimize nutrition  Medications: hormones ( pill, IUD, injection, ring, patch, implant, combined and progestin only) and TXA  Surgery: D&C, Hysteroscopy, Ablation, Hysterectomy           Diagnoses and all orders for this visit:    Menorrhagia with irregular cycle          Subjective:      Patient ID: Gilberto Ac is a 44 y.o. female. Patient here for 1 month follow up from ED. Seen for AUB and pelvic pain. Had ablation 23. No bleeding until -. Prescribed provera, did not work at all. Patient is "traumitized" by this experience. Emotional currently. Feels very weak and lethargic. 44yo  0 1 3 with return of menorrhagia. Ablation completed in 2023 and had about 6 weeks with no bleed then large, heavy gushing. Very traumatic. Wants definitive treatment. Plan for hysterectomy. Gynecologic Exam  She complains of vaginal bleeding. She reports no genital itching, genital lesions, genital odor, genital rash, pelvic pain or vaginal discharge. This is a new problem. The current episode started more than 1 month ago. The problem occurs intermittently. The problem has been gradually worsening since onset. The patient is experiencing no pain.  Pertinent negatives include no abdominal pain, back pain, chills, constipation, diarrhea, dysuria, fever, flank pain, frequency, headaches, hematuria, nausea, painful intercourse, rash, sore throat, urgency or vomiting. The vaginal bleeding is heavier than menses. Patient has been passing clots. Patient has not been passing tissue. The symptoms are aggravated by activity, tactile pressure and heavy lifting. Past treatments include NSAIDs. The treatment provided no relief. She is sexually active. The patient's menstrual history has been regular. The following portions of the patient's history were reviewed and updated as appropriate: She  has a past medical history of Anemia, CTS (carpal tunnel syndrome), Hemorrhoids, and History of transfusion. She   Patient Active Problem List    Diagnosis Date Noted    Menorrhagia with irregular cycle 2023    Morbid (severe) obesity due to excess calories ([address]) 2023    Obesity, Class III, BMI 40-49.9 (morbid obesity) ([address]) 2023    S/P endometrial ablation 2023    Ovarian cyst, right 2023    Thrombocytosis 2021    Iron deficiency anemia 2021     She  has a past surgical history that includes  section; Carpal tunnel release; Tubal ligation; pr hysteroscopy bx endometrium&/polypc w/wo d&c (N/A, 2023); and pr hysteroscopy endometrial ablation (N/A, 2023). Her family history is not on file. She  reports that she has never smoked. She has never used smokeless tobacco. She reports that she does not drink alcohol and does not use drugs. Current Outpatient Medications   Medication Sig Dispense Refill    ibuprofen (MOTRIN) 600 mg tablet Take by mouth every 6 (six) hours as needed for mild pain      lidocaine (LIDODERM) 5 % Apply 1 patch topically daily Remove & Discard patch within 12 hours or as directed by MD 30 patch 0    medroxyPROGESTERone (PROVERA) 10 mg tablet Take 1 tablet (10 mg total) by mouth 2 (two) times a day 30 tablet 0     No current facility-administered medications for this visit.      Current Outpatient Medications on File Prior to Visit   Medication Sig    ibuprofen (MOTRIN) 600 mg tablet Take by mouth every 6 (six) hours as needed for mild pain    lidocaine (LIDODERM) 5 % Apply 1 patch topically daily Remove & Discard patch within 12 hours or as directed by MD    medroxyPROGESTERone (PROVERA) 10 mg tablet Take 1 tablet (10 mg total) by mouth 2 (two) times a day     No current facility-administered medications on file prior to visit. She is allergic to other, wound dressing adhesive, and latex. .    Review of Systems   Constitutional:  Positive for fatigue. Negative for activity change, appetite change, chills and fever. HENT:  Negative for rhinorrhea, sneezing and sore throat. Eyes:  Negative for visual disturbance. Respiratory:  Negative for cough, shortness of breath and wheezing. Cardiovascular:  Negative for chest pain, palpitations and leg swelling. Gastrointestinal:  Negative for abdominal distention, abdominal pain, constipation, diarrhea, nausea and vomiting. Genitourinary:  Positive for menstrual problem and vaginal bleeding. Negative for difficulty urinating, dysuria, flank pain, frequency, hematuria, pelvic pain, urgency and vaginal discharge. Musculoskeletal:  Negative for back pain. Skin:  Negative for rash. Neurological:  Negative for syncope, light-headedness and headaches. Objective:      /70 (BP Location: Left arm, Patient Position: Sitting, Cuff Size: Standard)   Ht 5' 7" (1.702 m)   Wt 130 kg (286 lb 3.2 oz)   BMI 44.83 kg/m²          Physical Exam  Constitutional:       General: She is not in acute distress. Appearance: She is well-developed. She is not diaphoretic. Neck:      Vascular: No JVD. Cardiovascular:      Rate and Rhythm: Normal rate and regular rhythm. Heart sounds: Normal heart sounds. No murmur heard. No friction rub. No gallop. Pulmonary:      Effort: Pulmonary effort is normal. No respiratory distress.       Breath sounds: Normal breath sounds. Abdominal:      General: Bowel sounds are normal. There is no distension. Palpations: Abdomen is soft. Tenderness: There is no abdominal tenderness. There is no guarding or rebound. Genitourinary:     Labia:         Right: No rash, tenderness or lesion. Left: No rash, tenderness or lesion. Vagina: Normal. No vaginal discharge, erythema or tenderness. Cervix: No cervical motion tenderness, discharge or friability. Uterus: Not deviated, not enlarged, not fixed and not tender. Adnexa:         Right: No mass, tenderness or fullness. Left: No mass, tenderness or fullness. Musculoskeletal:      Cervical back: Neck supple. Right lower leg: No edema. Left lower leg: No edema. Lymphadenopathy:      Cervical: No cervical adenopathy. Neurological:      Mental Status: She is alert and oriented to person, place, and time. Deep Tendon Reflexes: Reflexes are normal and symmetric. I have spent a total time of 30 minutes on 09/29/23 in caring for this patient including Prognosis, Risks and benefits of tx options, Instructions for management, Impressions, Counseling / Coordination of care, Documenting in the medical record and Reviewing / ordering tests, medicine, procedures  .

## 2023-10-26 ENCOUNTER — ANESTHESIA EVENT (OUTPATIENT)
Dept: PERIOP | Facility: HOSPITAL | Age: 43
End: 2023-10-26
Payer: COMMERCIAL

## 2023-10-27 ENCOUNTER — ANESTHESIA (OUTPATIENT)
Dept: PERIOP | Facility: HOSPITAL | Age: 43
End: 2023-10-27
Payer: COMMERCIAL

## 2023-10-27 ENCOUNTER — HOSPITAL ENCOUNTER (OUTPATIENT)
Facility: HOSPITAL | Age: 43
Setting detail: OUTPATIENT SURGERY
Discharge: HOME | End: 2023-10-27
Attending: OBSTETRICS & GYNECOLOGY | Admitting: OBSTETRICS & GYNECOLOGY
Payer: COMMERCIAL

## 2023-10-27 VITALS
DIASTOLIC BLOOD PRESSURE: 68 MMHG | WEIGHT: 285.5 LBS | OXYGEN SATURATION: 97 % | BODY MASS INDEX: 44.81 KG/M2 | RESPIRATION RATE: 16 BRPM | SYSTOLIC BLOOD PRESSURE: 120 MMHG | HEART RATE: 74 BPM | HEIGHT: 67 IN | TEMPERATURE: 97.3 F

## 2023-10-27 DIAGNOSIS — N92.0 MENORRHAGIA WITH REGULAR CYCLE: ICD-10-CM

## 2023-10-27 DIAGNOSIS — G89.18 POSTOPERATIVE PAIN: Primary | ICD-10-CM

## 2023-10-27 PROBLEM — Z90.710 S/P LAPAROSCOPIC HYSTERECTOMY: Status: ACTIVE | Noted: 2023-10-27

## 2023-10-27 PROBLEM — E66.01 OBESITY, CLASS III, BMI 40-49.9 (MORBID OBESITY) (HCC): Chronic | Status: ACTIVE | Noted: 2023-07-06

## 2023-10-27 LAB
ABO GROUP BLD: NORMAL
EXT PREGNANCY TEST URINE: NEGATIVE
EXT. CONTROL: NORMAL
GLUCOSE SERPL-MCNC: 88 MG/DL (ref 65–140)
RH BLD: POSITIVE

## 2023-10-27 PROCEDURE — 58571 TLH W/T/O 250 G OR LESS: CPT | Performed by: OBSTETRICS & GYNECOLOGY

## 2023-10-27 PROCEDURE — 82948 REAGENT STRIP/BLOOD GLUCOSE: CPT

## 2023-10-27 PROCEDURE — 81025 URINE PREGNANCY TEST: CPT | Performed by: OBSTETRICS & GYNECOLOGY

## 2023-10-27 PROCEDURE — 88307 TISSUE EXAM BY PATHOLOGIST: CPT | Performed by: PATHOLOGY

## 2023-10-27 RX ORDER — IBUPROFEN 600 MG/1
600 TABLET ORAL EVERY 6 HOURS PRN
Qty: 30 TABLET | Refills: 0 | Status: SHIPPED | OUTPATIENT
Start: 2023-10-27

## 2023-10-27 RX ORDER — FENTANYL CITRATE/PF 50 MCG/ML
50 SYRINGE (ML) INJECTION
Status: DISCONTINUED | OUTPATIENT
Start: 2023-10-27 | End: 2023-10-27 | Stop reason: HOSPADM

## 2023-10-27 RX ORDER — MAGNESIUM HYDROXIDE 1200 MG/15ML
LIQUID ORAL AS NEEDED
Status: DISCONTINUED | OUTPATIENT
Start: 2023-10-27 | End: 2023-10-27 | Stop reason: HOSPADM

## 2023-10-27 RX ORDER — HYDROMORPHONE HCL/PF 1 MG/ML
0.4 SYRINGE (ML) INJECTION
Status: DISCONTINUED | OUTPATIENT
Start: 2023-10-27 | End: 2023-10-27 | Stop reason: HOSPADM

## 2023-10-27 RX ORDER — LIDOCAINE HYDROCHLORIDE AND EPINEPHRINE 10; 10 MG/ML; UG/ML
INJECTION, SOLUTION INFILTRATION; PERINEURAL AS NEEDED
Status: DISCONTINUED | OUTPATIENT
Start: 2023-10-27 | End: 2023-10-27 | Stop reason: HOSPADM

## 2023-10-27 RX ORDER — SODIUM CHLORIDE 9 MG/ML
INJECTION, SOLUTION INTRAVENOUS CONTINUOUS PRN
Status: DISCONTINUED | OUTPATIENT
Start: 2023-10-27 | End: 2023-10-27

## 2023-10-27 RX ORDER — HYDROMORPHONE HYDROCHLORIDE 1 MG/ML
INJECTION, SOLUTION INTRAMUSCULAR; INTRAVENOUS; SUBCUTANEOUS AS NEEDED
Status: DISCONTINUED | OUTPATIENT
Start: 2023-10-27 | End: 2023-10-27

## 2023-10-27 RX ORDER — LIDOCAINE HYDROCHLORIDE 10 MG/ML
INJECTION, SOLUTION EPIDURAL; INFILTRATION; INTRACAUDAL; PERINEURAL AS NEEDED
Status: DISCONTINUED | OUTPATIENT
Start: 2023-10-27 | End: 2023-10-27

## 2023-10-27 RX ORDER — IBUPROFEN 600 MG/1
600 TABLET ORAL EVERY 6 HOURS PRN
Status: DISCONTINUED | OUTPATIENT
Start: 2023-10-27 | End: 2023-10-27 | Stop reason: HOSPADM

## 2023-10-27 RX ORDER — KETOROLAC TROMETHAMINE 30 MG/ML
INJECTION, SOLUTION INTRAMUSCULAR; INTRAVENOUS AS NEEDED
Status: DISCONTINUED | OUTPATIENT
Start: 2023-10-27 | End: 2023-10-27

## 2023-10-27 RX ORDER — PHENAZOPYRIDINE HYDROCHLORIDE 100 MG/1
100 TABLET, FILM COATED ORAL ONCE
Status: COMPLETED | OUTPATIENT
Start: 2023-10-27 | End: 2023-10-27

## 2023-10-27 RX ORDER — GABAPENTIN 100 MG/1
200 CAPSULE ORAL ONCE
Status: COMPLETED | OUTPATIENT
Start: 2023-10-27 | End: 2023-10-27

## 2023-10-27 RX ORDER — ONDANSETRON 2 MG/ML
4 INJECTION INTRAMUSCULAR; INTRAVENOUS ONCE AS NEEDED
Status: DISCONTINUED | OUTPATIENT
Start: 2023-10-27 | End: 2023-10-27 | Stop reason: HOSPADM

## 2023-10-27 RX ORDER — ONDANSETRON 2 MG/ML
4 INJECTION INTRAMUSCULAR; INTRAVENOUS EVERY 6 HOURS PRN
Status: DISCONTINUED | OUTPATIENT
Start: 2023-10-27 | End: 2023-10-27 | Stop reason: HOSPADM

## 2023-10-27 RX ORDER — FENTANYL CITRATE 50 UG/ML
INJECTION, SOLUTION INTRAMUSCULAR; INTRAVENOUS AS NEEDED
Status: DISCONTINUED | OUTPATIENT
Start: 2023-10-27 | End: 2023-10-27

## 2023-10-27 RX ORDER — OXYCODONE HYDROCHLORIDE 5 MG/1
5 TABLET ORAL EVERY 4 HOURS PRN
Qty: 10 TABLET | Refills: 0 | Status: SHIPPED | OUTPATIENT
Start: 2023-10-27 | End: 2023-11-06

## 2023-10-27 RX ORDER — ACETAMINOPHEN 325 MG/1
975 TABLET ORAL ONCE
Status: COMPLETED | OUTPATIENT
Start: 2023-10-27 | End: 2023-10-27

## 2023-10-27 RX ORDER — ROCURONIUM BROMIDE 10 MG/ML
INJECTION, SOLUTION INTRAVENOUS AS NEEDED
Status: DISCONTINUED | OUTPATIENT
Start: 2023-10-27 | End: 2023-10-27

## 2023-10-27 RX ORDER — ACETAMINOPHEN 325 MG/1
650 TABLET ORAL EVERY 6 HOURS PRN
Status: DISCONTINUED | OUTPATIENT
Start: 2023-10-27 | End: 2023-10-27 | Stop reason: HOSPADM

## 2023-10-27 RX ORDER — SODIUM CHLORIDE, SODIUM LACTATE, POTASSIUM CHLORIDE, CALCIUM CHLORIDE 600; 310; 30; 20 MG/100ML; MG/100ML; MG/100ML; MG/100ML
125 INJECTION, SOLUTION INTRAVENOUS CONTINUOUS
Status: DISCONTINUED | OUTPATIENT
Start: 2023-10-27 | End: 2023-10-27

## 2023-10-27 RX ORDER — PROPOFOL 10 MG/ML
INJECTION, EMULSION INTRAVENOUS AS NEEDED
Status: DISCONTINUED | OUTPATIENT
Start: 2023-10-27 | End: 2023-10-27

## 2023-10-27 RX ORDER — DEXAMETHASONE SODIUM PHOSPHATE 10 MG/ML
INJECTION, SOLUTION INTRAMUSCULAR; INTRAVENOUS AS NEEDED
Status: DISCONTINUED | OUTPATIENT
Start: 2023-10-27 | End: 2023-10-27

## 2023-10-27 RX ORDER — MIDAZOLAM HYDROCHLORIDE 2 MG/2ML
INJECTION, SOLUTION INTRAMUSCULAR; INTRAVENOUS AS NEEDED
Status: DISCONTINUED | OUTPATIENT
Start: 2023-10-27 | End: 2023-10-27

## 2023-10-27 RX ORDER — OXYCODONE HYDROCHLORIDE 10 MG/1
10 TABLET ORAL EVERY 4 HOURS PRN
Status: DISCONTINUED | OUTPATIENT
Start: 2023-10-27 | End: 2023-10-27 | Stop reason: HOSPADM

## 2023-10-27 RX ORDER — ONDANSETRON 2 MG/ML
INJECTION INTRAMUSCULAR; INTRAVENOUS AS NEEDED
Status: DISCONTINUED | OUTPATIENT
Start: 2023-10-27 | End: 2023-10-27

## 2023-10-27 RX ORDER — OXYCODONE HYDROCHLORIDE 5 MG/1
5 TABLET ORAL EVERY 4 HOURS PRN
Status: DISCONTINUED | OUTPATIENT
Start: 2023-10-27 | End: 2023-10-27 | Stop reason: HOSPADM

## 2023-10-27 RX ADMIN — ROCURONIUM BROMIDE 10 MG: 10 INJECTION, SOLUTION INTRAVENOUS at 09:37

## 2023-10-27 RX ADMIN — ROCURONIUM BROMIDE 50 MG: 10 INJECTION, SOLUTION INTRAVENOUS at 08:06

## 2023-10-27 RX ADMIN — HYDROMORPHONE HYDROCHLORIDE 0.5 MG: 1 INJECTION, SOLUTION INTRAMUSCULAR; INTRAVENOUS; SUBCUTANEOUS at 09:09

## 2023-10-27 RX ADMIN — GABAPENTIN 200 MG: 100 CAPSULE ORAL at 07:35

## 2023-10-27 RX ADMIN — SUGAMMADEX 260 MG: 100 INJECTION, SOLUTION INTRAVENOUS at 10:10

## 2023-10-27 RX ADMIN — SODIUM CHLORIDE: 0.9 INJECTION, SOLUTION INTRAVENOUS at 08:15

## 2023-10-27 RX ADMIN — ONDANSETRON 4 MG: 2 INJECTION INTRAMUSCULAR; INTRAVENOUS at 13:02

## 2023-10-27 RX ADMIN — ROCURONIUM BROMIDE 10 MG: 10 INJECTION, SOLUTION INTRAVENOUS at 08:35

## 2023-10-27 RX ADMIN — PHENYLEPHRINE HYDROCHLORIDE 40 MCG/MIN: 10 INJECTION INTRAVENOUS at 08:25

## 2023-10-27 RX ADMIN — SODIUM CHLORIDE, SODIUM LACTATE, POTASSIUM CHLORIDE, AND CALCIUM CHLORIDE 125 ML/HR: .6; .31; .03; .02 INJECTION, SOLUTION INTRAVENOUS at 07:38

## 2023-10-27 RX ADMIN — ROCURONIUM BROMIDE 10 MG: 10 INJECTION, SOLUTION INTRAVENOUS at 09:05

## 2023-10-27 RX ADMIN — PROPOFOL 200 MG: 10 INJECTION, EMULSION INTRAVENOUS at 08:06

## 2023-10-27 RX ADMIN — DEXAMETHASONE SODIUM PHOSPHATE 10 MG: 10 INJECTION, SOLUTION INTRAMUSCULAR; INTRAVENOUS at 08:06

## 2023-10-27 RX ADMIN — KETOROLAC TROMETHAMINE 30 MG: 30 INJECTION, SOLUTION INTRAMUSCULAR; INTRAVENOUS at 10:07

## 2023-10-27 RX ADMIN — PHENAZOPYRIDINE 100 MG: 100 TABLET ORAL at 07:35

## 2023-10-27 RX ADMIN — HYDROMORPHONE HYDROCHLORIDE 0.5 MG: 1 INJECTION, SOLUTION INTRAMUSCULAR; INTRAVENOUS; SUBCUTANEOUS at 09:46

## 2023-10-27 RX ADMIN — LIDOCAINE HYDROCHLORIDE 100 MG: 10 INJECTION, SOLUTION EPIDURAL; INFILTRATION; INTRACAUDAL; PERINEURAL at 08:06

## 2023-10-27 RX ADMIN — CEFAZOLIN 3000 MG: 1 INJECTION, POWDER, FOR SOLUTION INTRAMUSCULAR; INTRAVENOUS at 07:53

## 2023-10-27 RX ADMIN — ONDANSETRON 4 MG: 2 INJECTION INTRAMUSCULAR; INTRAVENOUS at 10:00

## 2023-10-27 RX ADMIN — FENTANYL CITRATE 100 MCG: 50 INJECTION, SOLUTION INTRAMUSCULAR; INTRAVENOUS at 08:06

## 2023-10-27 RX ADMIN — MIDAZOLAM HYDROCHLORIDE 2 MG: 1 INJECTION, SOLUTION INTRAMUSCULAR; INTRAVENOUS at 08:01

## 2023-10-27 RX ADMIN — ACETAMINOPHEN 975 MG: 325 TABLET, FILM COATED ORAL at 07:35

## 2023-10-27 NOTE — INTERVAL H&P NOTE
H&P reviewed. After examining the patient I find no changes in the patients condition since the H&P had been written.     Vitals:    10/27/23 0707   BP: 115/79   Pulse: 79   Resp: 13   Temp: (!) 97 °F (36.1 °C)   SpO2: 97%

## 2023-10-27 NOTE — ANESTHESIA PREPROCEDURE EVALUATION
Procedure:  TOTAL LAPAROSCOPIC HYSTERECTOMY WITH BILATERAL SALPINGECTOMY (Bilateral: Abdomen)    45 yo F with PMHx of abnormal uterine bleeding. Denies previous complications from anesthesia, denies CONSUELO. METS>4, NPO clears>2h. Relevant Problems   ANESTHESIA (within normal limits)      CARDIO (within normal limits)      ENDO (within normal limits)      GI/HEPATIC (within normal limits)      /RENAL (within normal limits)      HEMATOLOGY   (+) Iron deficiency anemia      MUSCULOSKELETAL (within normal limits)      NEURO/PSYCH (within normal limits)      Other   (+) Menorrhagia with irregular cycle   (+) Obesity, Class III, BMI 40-49.9 (morbid obesity) (HCC)        Lab Results   Component Value Date/Time    WBC 11.58 (H) 08/30/2023 10:23 PM    RBC 3.74 (L) 08/30/2023 10:23 PM    HGB 10.1 (L) 08/30/2023 10:23 PM    HCT 32.8 (L) 08/30/2023 10:23 PM     (H) 08/30/2023 10:23 PM       Physical Exam    Airway    Mallampati score: II  TM Distance: >3 FB  Neck ROM: full     Dental   No notable dental hx     Cardiovascular  Rhythm: regular, Rate: normal, Cardiovascular exam normal    Pulmonary  Pulmonary exam normal Breath sounds clear to auscultation    Other Findings        Anesthesia Plan  ASA Score- 3     Anesthesia Type- general with ASA Monitors. Additional Monitors:     Airway Plan: ETT. Plan Factors-Exercise tolerance (METS): >4 METS. Chart reviewed. EKG reviewed. Imaging results reviewed. Existing labs reviewed. Patient summary reviewed. Patient is not a current smoker. Obstructive sleep apnea risk education given perioperatively. Induction- intravenous. Postoperative Plan- Plan for postoperative opioid use. Planned trial extubation    Informed Consent- Anesthetic plan and risks discussed with patient. I personally reviewed this patient with the CRNA. Discussed and agreed on the Anesthesia Plan with the CRNA. Riddhi Schmitz

## 2023-10-27 NOTE — OP NOTE
OPERATIVE REPORT  PATIENT NAME: Nancy Butt    :  1980  MRN: [id number]  Pt Location: [facility] OR ROOM 02    SURGERY DATE: 10/27/2023    Surgeon(s) and Role:     * Kimberly Coburn MD - Primary    Preop Diagnosis:  Menorrhagia with regular cycle [N92.0]    Post-Op Diagnosis Codes:     * Menorrhagia with regular cycle [N92.0]    Procedure(s):  Bilateral - TOTAL LAPAROSCOPIC HYSTERECTOMY WITH BILATERAL SALPINGECTOMY    Specimen(s):  ID Type Source Tests Collected by Time Destination   1 : Uterus, cervix, bilateral ovaries, and bilateral fallopian tubes Tissue Uterus w/Bilateral Ovaries and Fallopian Tubes TISSUE EXAM Kimberly Coburn MD 10/27/2023 9003        Estimated Blood Loss:   Minimal    Drains:  [REMOVED] Urethral Catheter Non-latex 16 Fr. (Removed)   Site Assessment Clean;Skin intact 10/27/23 0856   Collection Container Standard drainage bag 10/27/23 0856   Securement Method Leg strap 10/27/23 0856   Number of days: 0       Anesthesia Type:   General    Operative Indications:  Menorrhagia with regular cycle [N92.0]  Pelvic adhesions    Operative Findings:  Omental adhesions to the anterior abdominal wall. Large left paratubal cyst with bowel adhesions. Large boggy uterus sounded to [id number]      Complications:   Franne Knows made to remove ovaries due to adhesions and hemostasis    Procedure and Technique:  Patient was identified in the holding area and brought back to the operating room where general anesthesia was initiated. Bilateral lower extremity compression boots were placed prior to initiation of anesthesia. Patient was placed in the dorsal lithotomy position and prepped and draped in a sterile fashion. Timeout procedure was completed. Exam under anesthesia was completed and above findings noted. A speculum and nicole were placed into the vagina for visualization of the cervix. A single toothed tenaculum was used to grasp the anterior lip of the cervix.   The uterus was sounded and the cervix was dilated to accommodate the insertion of the uterine manipulator. Bilateral stay sutures were placed laterally on the cervix and attached to the [place] manipulator after insertion. The vaginal occluder was inflated with 60mL of fluid. Allen catheter was inserted into the bladder. Attention was then turned to the abdomen. Intraumbilical 5mm incision was made and with laparoscopic visualization the port was placed without difficulty. Two additional ports were placed under direct laparoscopic visualization in the right and left lower quadrants. The anatomy was inspected. The above findings were noted. Attention was directed first to the left side. The fallopian tube was grasped an elevated. 3cm cyst arising from fallopian tube with bowel adhesions to cyst and ovary. Adhesions taken down with Harmonic Ace. Bleeding noted from ovary so decision made to remove ovary as well. Using Harmonic ACE the IPL was grasped, sealed and seperated. The round ligament was identified cauterized and cut. The posterior leaf of the broad ligament was incised. The left uteroovarian ligament was cauterized and cut. The anterior leaf of the broad ligament was dissected to the vesicouterine peritoneum and a bladder flap was created. The vesicouterine peritoneum was thickened due to history of csection. The bladder flap was gently dissected off the lower uterine segment and cervix below the level of the Koh cervical cup. Slight bleeding initially noted which clotted without further cautery. On the right side, the ovary and fallopian tube appeared normal, with manipulation of the tissue the ovary began to bleed. Decision made to remove the ovary for hemostasis. The IPL was identified, grasped the the Harmonic Ace and sealed and . The round ligament was identified cauterized and cut. The posterior leaf of the broad ligament was incised. The left uteroovarian ligament was cauterized and cut.   The anterior leaf of the broad ligament was dissected to the vesicouterine peritoneum The uterine vessels were skeletonized, cauterized and cut. Once both sets of uterine arteries were cauterized and cut attention then turned to the colpotomy. Using monopolar cautery the colpotomy was completed due to the shape and bogginess of the uterus, some manipulation was needed to adequately visualize the colpotomy site. The specimen was removed from the pelvis and sent to pathology. Using the endostitch device a V-Lock suture was used to close the vaginal cuff. The vaginal cuff was closed with a running suture with good hemostasis. The pelvis was irrigated. Good hemostasis was verified. The gas was allowed to escape and all other ports were removed. Attention then turned to the perineum. Cystoscopy was completed. Bilateral ureteral jets were visualized. The bladder was intact and free of suture. The abdominal incisions were closed using 4-0 Monocryl and sterile surgical glue. The vaginal cuff was visualized from below and good hemostasis was assured. All instruments were removed from the vagina. The patient was extubated and brought to the recovery room in stable condition. All sponge, lap and needle counts were correct. I was present for the entire procedure. and I was present for all critical portions of the procedure.     Patient Disposition:  PACU         SIGNATURE: Tawanda Jolley MD  DATE: October 27, 2023  TIME: 10:09 AM

## 2023-10-27 NOTE — ANESTHESIA POSTPROCEDURE EVALUATION
Post-Op Assessment Note    CV Status:  Stable  Pain Score: 0    Pain management: adequate     Mental Status:  Alert and awake   Hydration Status:  Euvolemic   PONV Controlled:  Controlled   Airway Patency:  Patent      Post Op Vitals Reviewed: Yes      Staff: CRNA         No notable events documented.     /78 (10/27/23 1023)    Temp  97.9   Pulse 87 (10/27/23 1023)   Resp 12 (10/27/23 1023)    SpO2 98 % (10/27/23 1023)

## 2023-10-31 PROCEDURE — 88307 TISSUE EXAM BY PATHOLOGIST: CPT | Performed by: PATHOLOGY

## 2023-11-06 LAB — GLUCOSE SERPL-MCNC: 128 MG/DL (ref 65–140)

## 2024-09-21 ENCOUNTER — HOSPITAL ENCOUNTER (EMERGENCY)
Facility: HOSPITAL | Age: 44
Discharge: HOME | End: 2024-09-22
Attending: EMERGENCY MEDICINE
Payer: COMMERCIAL

## 2024-09-21 VITALS
TEMPERATURE: 97.8 F | OXYGEN SATURATION: 100 % | HEART RATE: 106 BPM | DIASTOLIC BLOOD PRESSURE: 86 MMHG | SYSTOLIC BLOOD PRESSURE: 165 MMHG | RESPIRATION RATE: 20 BRPM
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
## 2024-09-21 PROCEDURE — 99284 EMERGENCY DEPT VISIT MOD MDM: CPT

## 2024-09-21 RX ORDER — ACETAMINOPHEN 325 MG/1
650 TABLET ORAL ONCE
Status: COMPLETED | OUTPATIENT
Start: 2024-09-21 | End: 2024-09-21

## 2024-09-21 RX ADMIN — ACETAMINOPHEN 650 MG: 325 TABLET, FILM COATED ORAL at 23:41
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
## (undated) DEVICE — TROCAR: Brand: KII® SLEEVE

## (undated) DEVICE — SPONGE LAP 18 X 18 IN STRL RFD

## (undated) DEVICE — TROCAR: Brand: KII FIOS FIRST ENTRY

## (undated) DEVICE — SYRINGE 50ML LL

## (undated) DEVICE — METZENBAUM ADTEC SINGLE USE DISSECTING SCISSORS, SHAFT ONLY, MONOPOLAR, CURVED TO LEFT, WORKING LENGTH: 12 1/4", (310 MM), DIAM. 5 MM, INSULATED, DOUBLE ACTION, STERILE, DISPOSABLE, PACKAGE OF 10 PIECES: Brand: AESCULAP

## (undated) DEVICE — ADHESIVE SKIN HIGH VISCOSITY EXOFIN 1ML

## (undated) DEVICE — SYRINGE 10ML LL

## (undated) DEVICE — GLOVE INDICATOR PI UNDERGLOVE SZ 6.5 BLUE

## (undated) DEVICE — SCD SEQUENTIAL COMPRESSION COMFORT SLEEVE MEDIUM KNEE LENGTH: Brand: KENDALL SCD

## (undated) DEVICE — TOWEL SURG XR DETECT GREEN STRL RFD

## (undated) DEVICE — CHLORHEXIDINE 4PCT 4 OZ

## (undated) DEVICE — NEEDLE SPINAL18G X 3.5 IN QUINCKE

## (undated) DEVICE — CYSTO TUBING SINGLE IRRIGATION

## (undated) DEVICE — MAYO STAND COVER: Brand: CONVERTORS

## (undated) DEVICE — HEAVY DUTY TABLE COVER: Brand: CONVERTORS

## (undated) DEVICE — INTENDED FOR TISSUE SEPARATION, AND OTHER PROCEDURES THAT REQUIRE A SHARP SURGICAL BLADE TO PUNCTURE OR CUT.: Brand: BARD-PARKER SAFETY BLADES SIZE 11, STERILE

## (undated) DEVICE — 3000CC GUARDIAN II: Brand: GUARDIAN

## (undated) DEVICE — LIGHT HANDLE COVER SLEEVE DISP BLUE STELLAR

## (undated) DEVICE — UTERINE MANIPULATOR RUMI DISP TIP 6.7X12CM ORANGE

## (undated) DEVICE — PAD GROUNDING ADULT

## (undated) DEVICE — SUTURING DEVICE: Brand: ENDO STITCH

## (undated) DEVICE — STRL ALLENTOWN HYSTEROSCOPY PK: Brand: CARDINAL HEALTH

## (undated) DEVICE — SPONGE 4 X 4 XRAY 16 PLY STRL LF RFD

## (undated) DEVICE — BETHLEHEM UNIVERSAL GYN LAP PK: Brand: CARDINAL HEALTH

## (undated) DEVICE — SPONGE LAP 18 X 18 IN

## (undated) DEVICE — NEPTUNE E-SEP SMOKE EVACUATION PENCIL, COATED, 70MM BLADE, PUSH BUTTON SWITCH: Brand: NEPTUNE E-SEP

## (undated) DEVICE — TUBING SMOKE EVAC W/FILTRATION DEVICE PLUMEPORT ACTIV

## (undated) DEVICE — OCCLUDER COLPO-PNEUMO

## (undated) DEVICE — ELECTRODE LAP SPATULA CRV E-Z CLEAN 33CM -0018C

## (undated) DEVICE — DRAPE EQUIPMENT RF WAND

## (undated) DEVICE — LIGHT GLOVE GREEN

## (undated) DEVICE — TRAY FOLEY 16FR URIMETER SILICONE SURESTEP

## (undated) DEVICE — TIBURON LAPAROSCOPIC ABDOMINAL DRAPE: Brand: CONVERTORS

## (undated) DEVICE — 1820 FOAM BLOCK NEEDLE COUNTER: Brand: DEVON

## (undated) DEVICE — PVC URETHRAL CATHETER: Brand: DOVER

## (undated) DEVICE — IRRIG ENDO FLO TUBING

## (undated) DEVICE — SUT VLOC 180  0 8IN  VLOCA008L

## (undated) DEVICE — 4-PORT MANIFOLD: Brand: NEPTUNE 2

## (undated) DEVICE — SUT MONOCRYL 4-0 PS-2 18 IN Y496G

## (undated) DEVICE — 40583 XL ADVANCED TRENDELENBURG POSITIONING KIT: Brand: 40583 XL ADVANCED TRENDELENBURG POSITIONING KIT

## (undated) DEVICE — [HIGH FLOW INSUFFLATOR,  DO NOT USE IF PACKAGE IS DAMAGED,  KEEP DRY,  KEEP AWAY FROM SUNLIGHT,  PROTECT FROM HEAT AND RADIOACTIVE SOURCES.]: Brand: PNEUMOSURE

## (undated) DEVICE — CHLORAPREP HI-LITE 26ML ORANGE

## (undated) DEVICE — HARMONIC 1100 SHEARS, 36CM SHAFT LENGTH: Brand: HARMONIC